# Patient Record
Sex: MALE | Race: WHITE | NOT HISPANIC OR LATINO | Employment: OTHER | URBAN - METROPOLITAN AREA
[De-identification: names, ages, dates, MRNs, and addresses within clinical notes are randomized per-mention and may not be internally consistent; named-entity substitution may affect disease eponyms.]

---

## 2021-09-16 ENCOUNTER — OFFICE VISIT (OUTPATIENT)
Dept: INTERNAL MEDICINE | Facility: CLINIC | Age: 46
End: 2021-09-16
Payer: COMMERCIAL

## 2021-09-16 VITALS
OXYGEN SATURATION: 96 % | DIASTOLIC BLOOD PRESSURE: 88 MMHG | SYSTOLIC BLOOD PRESSURE: 128 MMHG | WEIGHT: 217.81 LBS | HEIGHT: 73 IN | BODY MASS INDEX: 28.87 KG/M2 | TEMPERATURE: 98 F | HEART RATE: 81 BPM

## 2021-09-16 DIAGNOSIS — I10 ESSENTIAL HYPERTENSION: ICD-10-CM

## 2021-09-16 DIAGNOSIS — Z00.00 ROUTINE MEDICAL EXAM: Primary | ICD-10-CM

## 2021-09-16 PROCEDURE — 99999 PR PBB SHADOW E&M-NEW PATIENT-LVL III: ICD-10-PCS | Mod: PBBFAC,,, | Performed by: NURSE PRACTITIONER

## 2021-09-16 PROCEDURE — 99999 PR PBB SHADOW E&M-NEW PATIENT-LVL III: CPT | Mod: PBBFAC,,, | Performed by: NURSE PRACTITIONER

## 2021-09-16 PROCEDURE — 99204 OFFICE O/P NEW MOD 45 MIN: CPT | Mod: S$GLB,,, | Performed by: NURSE PRACTITIONER

## 2021-09-16 PROCEDURE — 99204 PR OFFICE/OUTPT VISIT, NEW, LEVL IV, 45-59 MIN: ICD-10-PCS | Mod: S$GLB,,, | Performed by: NURSE PRACTITIONER

## 2021-09-16 RX ORDER — MINERAL OIL
180 ENEMA (ML) RECTAL
COMMUNITY

## 2021-09-16 RX ORDER — LISINOPRIL 10 MG/1
10 TABLET ORAL DAILY
Qty: 90 TABLET | Refills: 1 | Status: SHIPPED | OUTPATIENT
Start: 2021-09-16 | End: 2022-03-24 | Stop reason: SDUPTHER

## 2021-09-16 RX ORDER — LISINOPRIL 10 MG/1
10 TABLET ORAL DAILY
COMMUNITY
Start: 2021-06-14 | End: 2021-09-16 | Stop reason: SDUPTHER

## 2022-01-19 ENCOUNTER — TELEPHONE (OUTPATIENT)
Dept: INTERNAL MEDICINE | Facility: CLINIC | Age: 47
End: 2022-01-19

## 2022-01-19 NOTE — TELEPHONE ENCOUNTER
----- Message from Bhargavi Guallpa sent at 1/19/2022  8:40 AM CST -----  Contact: REDD [phone]--  Patient would like to get medical advice.    Symptoms (please be specific):  --cough, fever 100.0, body aches,headaches and tested positive for COVID.    How long have you had these symptoms: --Sunday--    Would you like a call back, or a response through your [facility] portal?:--call back--    Pharmacy name and phone # (copy from chart):    [facility] #[id number] - [address] & [address] [address]  Phone: [phone] Fax: [phone]      Comments:  Pt mom calling to see what she needs to do because pt symptoms are getting worse. Please call to advise.

## 2022-01-19 NOTE — TELEPHONE ENCOUNTER
I called and the pt was just wondering if there was something more hw should be doing he tested positive Monday for COVID and I replied with the symptoms it has to really run it's course however , if it's more than usual and he notices he's getting worse definitely f/u he verbalized understanding. //kah

## 2022-03-24 ENCOUNTER — OFFICE VISIT (OUTPATIENT)
Dept: INTERNAL MEDICINE | Facility: CLINIC | Age: 47
End: 2022-03-24
Payer: COMMERCIAL

## 2022-03-24 VITALS
HEART RATE: 79 BPM | OXYGEN SATURATION: 97 % | BODY MASS INDEX: 28.36 KG/M2 | SYSTOLIC BLOOD PRESSURE: 138 MMHG | TEMPERATURE: 98 F | DIASTOLIC BLOOD PRESSURE: 92 MMHG | WEIGHT: 214.94 LBS

## 2022-03-24 DIAGNOSIS — M54.12 CERVICAL RADICULITIS: ICD-10-CM

## 2022-03-24 DIAGNOSIS — Z00.00 ROUTINE GENERAL MEDICAL EXAMINATION AT A HEALTH CARE FACILITY: Primary | ICD-10-CM

## 2022-03-24 DIAGNOSIS — I10 ESSENTIAL HYPERTENSION: ICD-10-CM

## 2022-03-24 DIAGNOSIS — Z98.890 HISTORY OF LAMINECTOMY: ICD-10-CM

## 2022-03-24 DIAGNOSIS — Z12.11 COLON CANCER SCREENING: ICD-10-CM

## 2022-03-24 DIAGNOSIS — I10 PRIMARY HYPERTENSION: ICD-10-CM

## 2022-03-24 DIAGNOSIS — Z91.09 ENVIRONMENTAL ALLERGIES: ICD-10-CM

## 2022-03-24 DIAGNOSIS — Z86.711 HISTORY OF PULMONARY EMBOLISM: ICD-10-CM

## 2022-03-24 PROCEDURE — 99214 PR OFFICE/OUTPT VISIT, EST, LEVL IV, 30-39 MIN: ICD-10-PCS | Mod: 25,S$GLB,, | Performed by: INTERNAL MEDICINE

## 2022-03-24 PROCEDURE — 99999 PR PBB SHADOW E&M-EST. PATIENT-LVL V: CPT | Mod: PBBFAC,,, | Performed by: INTERNAL MEDICINE

## 2022-03-24 PROCEDURE — 99396 PREV VISIT EST AGE 40-64: CPT | Mod: 25,S$GLB,, | Performed by: INTERNAL MEDICINE

## 2022-03-24 PROCEDURE — 99999 PR PBB SHADOW E&M-EST. PATIENT-LVL V: ICD-10-PCS | Mod: PBBFAC,,, | Performed by: INTERNAL MEDICINE

## 2022-03-24 PROCEDURE — 99214 OFFICE O/P EST MOD 30 MIN: CPT | Mod: 25,S$GLB,, | Performed by: INTERNAL MEDICINE

## 2022-03-24 PROCEDURE — 99396 PR PREVENTIVE VISIT,EST,40-64: ICD-10-PCS | Mod: 25,S$GLB,, | Performed by: INTERNAL MEDICINE

## 2022-03-24 RX ORDER — LISINOPRIL 10 MG/1
10 TABLET ORAL DAILY
Qty: 90 TABLET | Refills: 1 | Status: SHIPPED | OUTPATIENT
Start: 2022-03-24 | End: 2022-11-29

## 2022-03-24 RX ORDER — GABAPENTIN 300 MG/1
300 CAPSULE ORAL NIGHTLY
Qty: 30 CAPSULE | Refills: 0 | Status: SHIPPED | OUTPATIENT
Start: 2022-03-24 | End: 2022-04-29

## 2022-03-24 RX ORDER — PSEUDOEPHEDRINE HCL 120 MG/1
120 TABLET, FILM COATED, EXTENDED RELEASE ORAL
COMMUNITY
End: 2022-04-29

## 2022-03-24 RX ORDER — METHYLPREDNISOLONE 4 MG/1
TABLET ORAL
Qty: 1 EACH | Refills: 0 | Status: SHIPPED | OUTPATIENT
Start: 2022-03-24 | End: 2022-04-29 | Stop reason: ALTCHOICE

## 2022-03-24 NOTE — PROGRESS NOTES
Subjective:      Patient ID: Rafael Banegas is a 46 y.o. male.    Chief Complaint: Establish Care    HPI     47 yo with   Patient Active Problem List   Diagnosis    Primary hypertension    Environmental allergies    History of pulmonary embolism    History of laminectomy    Cervical radiculitis    Decreased range of motion with decreased strength     Past Medical History:   Diagnosis Date    Cervical radiculitis 3/29/2022    Hypertension      Here today for annual prev exam.  Compliant with meds without significant side effects. Energy and appetite are good.     Pt also c/o neck pain pain radiating into left arm. Worse with certain movements. Symptoms x 3 to 4 weeks. No weakness.       Past Surgical History:   Procedure Laterality Date    LUMBAR LAMINECTOMY  2017     Social History     Socioeconomic History    Marital status: Single   Tobacco Use    Smoking status: Never Smoker    Smokeless tobacco: Never Used   Substance and Sexual Activity    Alcohol use: Yes     Alcohol/week: 3.0 standard drinks     Types: 1 Glasses of wine, 2 Cans of beer per week    Drug use: Never    Sexual activity: Yes     Partners: Female     family history includes Arthritis in his maternal grandmother; Congenital heart disease in his maternal grandfather; Dementia in his father; Depression in his brother and mother; Heart block in his father; Heart disease in his father; Hypertension in his mother; Stroke in his father.    Review of Systems   Constitutional: Negative for chills and fever.   HENT: Negative for ear pain and sore throat.    Respiratory: Negative for cough.    Cardiovascular: Negative for chest pain.   Gastrointestinal: Negative for abdominal pain and blood in stool.   Genitourinary: Negative for dysuria and hematuria.   Skin: Negative for rash and wound.   Neurological: Negative for seizures and syncope.     Objective:   BP (!) 138/92   Pulse 79   Temp 97.7 °F (36.5 °C) (Tympanic)   Wt 97.5 kg (214 lb 15.2  oz)   SpO2 97%   BMI 28.36 kg/m²     Physical Exam  Constitutional:       General: He is not in acute distress.     Appearance: He is well-developed.   HENT:      Head: Normocephalic and atraumatic.   Eyes:      Pupils: Pupils are equal, round, and reactive to light.   Neck:      Thyroid: No thyromegaly.   Cardiovascular:      Rate and Rhythm: Normal rate and regular rhythm.   Pulmonary:      Breath sounds: Normal breath sounds. No wheezing or rales.   Abdominal:      General: Bowel sounds are normal.      Palpations: Abdomen is soft.      Tenderness: There is no abdominal tenderness.   Musculoskeletal:         General: No swelling. Normal range of motion.      Cervical back: Neck supple. No rigidity. No spinous process tenderness or muscular tenderness. Normal range of motion.   Lymphadenopathy:      Cervical: No cervical adenopathy.   Skin:     General: Skin is warm and dry.   Neurological:      Mental Status: He is alert and oriented to person, place, and time.      Motor: No weakness.      Gait: Gait normal.   Psychiatric:         Behavior: Behavior normal.         Assessment:     1. Routine general medical examination at a health care facility    2. Primary hypertension    3. Environmental allergies    4. History of pulmonary embolism    5. History of laminectomy    6. Colon cancer screening    7. Essential hypertension    8. Cervical radiculitis      Plan:   Routine general medical examination at a health care facility  Heart healthy diet and regular exercise.  Health maintenance reviewed patient  -     Comprehensive Metabolic Panel; Future; Expected date: 03/24/2022  -     CBC Auto Differential; Future; Expected date: 03/24/2022  -     TSH; Future; Expected date: 03/24/2022  -     Lipid Panel; Future; Expected date: 03/24/2022  -     Hepatitis C Antibody; Future; Expected date: 03/24/2022  -     Hemoglobin A1C; Future; Expected date: 03/24/2022  -     PSA, Screening; Future; Expected date:  03/24/2022    Primary hypertension    Environmental allergies    History of pulmonary embolism    History of laminectomy    Colon cancer screening  -     Cancel: Case Request Endoscopy: COLONOSCOPY    Essential hypertension  -     lisinopriL 10 MG tablet; Take 1 tablet (10 mg total) by mouth once daily.  Dispense: 90 tablet; Refill: 1    Cervical radiculitis  -     methylPREDNISolone (MEDROL, AFSHIN,) 4 mg tablet; use as directed  Dispense: 1 each; Refill: 0  -     gabapentin (NEURONTIN) 300 MG capsule; Take 1 capsule (300 mg total) by mouth every evening.  Dispense: 30 capsule; Refill: 0  -     Ambulatory referral/consult to Physical/Occupational Therapy; Future; Expected date: 03/31/2022  -     X-Ray Cervical Spine AP And Lateral; Future; Expected date: 03/24/2022        Lab Frequency Next Occurrence   Lipid Panel Once 09/16/2021   Comprehensive Metabolic Panel Once 09/16/2021   CBC Auto Differential Once 09/16/2021   TSH Once 09/16/2021       Problem List Items Addressed This Visit     Primary hypertension    Environmental allergies    History of pulmonary embolism    Overview     Surrounding laminectomy 2016. Reports neg genetic testing. Pulmonologist advised ok to stop anticoag.            History of laminectomy    Cervical radiculitis    Relevant Medications    methylPREDNISolone (MEDROL, AFSHIN,) 4 mg tablet    gabapentin (NEURONTIN) 300 MG capsule    Other Relevant Orders    Ambulatory referral/consult to Physical/Occupational Therapy    X-Ray Cervical Spine AP And Lateral (Completed)      Other Visit Diagnoses     Routine general medical examination at a health care facility    -  Primary    Relevant Orders    Comprehensive Metabolic Panel (Completed)    CBC Auto Differential (Completed)    TSH (Completed)    Lipid Panel (Completed)    Hepatitis C Antibody (Completed)    Hemoglobin A1C (Completed)    PSA, Screening (Completed)    Colon cancer screening        Essential hypertension        Relevant Medications     lisinopriL 10 MG tablet          Follow up in about 4 weeks (around 4/21/2022), or if symptoms worsen or fail to improve.  Wants fasting labs and xray  Monday at 8am.

## 2022-03-28 ENCOUNTER — HOSPITAL ENCOUNTER (OUTPATIENT)
Dept: RADIOLOGY | Facility: HOSPITAL | Age: 47
Discharge: HOME | End: 2022-03-28
Attending: INTERNAL MEDICINE
Payer: COMMERCIAL

## 2022-03-28 DIAGNOSIS — M54.12 CERVICAL RADICULITIS: ICD-10-CM

## 2022-03-28 PROCEDURE — 72040 X-RAY EXAM NECK SPINE 2-3 VW: CPT | Mod: TC

## 2022-03-28 PROCEDURE — 72040 X-RAY EXAM NECK SPINE 2-3 VW: CPT | Mod: 26,,, | Performed by: RADIOLOGY

## 2022-03-28 PROCEDURE — 72040 XR CERVICAL SPINE AP LATERAL: ICD-10-PCS | Mod: 26,,, | Performed by: RADIOLOGY

## 2022-03-29 ENCOUNTER — CLINICAL SUPPORT (OUTPATIENT)
Dept: REHABILITATION | Facility: HOSPITAL | Age: 47
End: 2022-03-29
Attending: INTERNAL MEDICINE
Payer: COMMERCIAL

## 2022-03-29 DIAGNOSIS — R53.1 DECREASED RANGE OF MOTION WITH DECREASED STRENGTH: ICD-10-CM

## 2022-03-29 DIAGNOSIS — M25.60 DECREASED RANGE OF MOTION WITH DECREASED STRENGTH: ICD-10-CM

## 2022-03-29 DIAGNOSIS — M54.12 CERVICAL RADICULITIS: ICD-10-CM

## 2022-03-29 PROCEDURE — 97161 PT EVAL LOW COMPLEX 20 MIN: CPT

## 2022-03-29 PROCEDURE — 97110 THERAPEUTIC EXERCISES: CPT

## 2022-03-29 NOTE — PLAN OF CARE
"[facility]  Physical Therapy Initial Evaluation    Name: Rafael Banegas  Clinic Number: [id number]    Therapy Diagnosis:  Encounter Diagnoses   Name Primary?    Cervical radiculitis     Decreased range of motion with decreased strength       Physician: Valdemar Mustafa MD   Physician Orders: PT Eval and Treat  Medical Diagnosis from Referral: Cervical radiculitis   Evaluation Date: 3/29/2022  Authorization Period Expiration: 12/31/22  Plan of Care Expiration: 05/29/2022    Progress Update: 04/29/2022  FOTO: 1 / 3    Visit # / Visits authorized: 1 / 1      PRECAUTIONS: Standard Precautions     MD Follow-up: 04/29/2022    Time In: 0945  Time Out: 1030  Total Appointment Time (timed & untimed codes): 30 minutes    SUBJECTIVE   Date of onset: 6 weeks     History of current condition - Rafael is a 46 y.o. male whom reports neck and left shoulder pain. The problem started about 6 weeks. He is experiencing numbness in the fingers as well. The patient states, "the pain increase on a skiing trip".   Rafael's currently exercises 3x a week. Has not exercise the last 2 weeks secondary to sinus infection.       Imaging: X-RAY performed on 03/28/2022  Mild degenerative changes    Prior Therapy: Yes  Social History: Pt lives with their family   Living Environment: House    ADLs unable to complete: dressing and working out   Gym/Home Equipment: yes   Occupation: Pt is Architecture     Prior Level of Function: Independent with all ADLs  Current Level of Function: 70% of PLOF  - Sitting Tolerance: 120 min  - Standing Tolerance: 15-60 min  - Walking Tolerance: 15-60 min  - Stair Tolerance: 3 flights    Pain:  Current 6 /10, worst 8 /10, best 4 /10   Location: Left shoulder (superior aspect)   Description: Aching, Throbbing and Tight  Aggravating Factors: Typing or overhead activities   Easing Factors: medication and rest     Pts goals: Pt reported goals are to be able to perform overhead activities without " pain.     _______________________________________________________  Medical History:   Past Medical History:   Diagnosis Date    Cervical radiculitis 3/29/2022    Hypertension        Surgical History:   Rafael Banegas  has a past surgical history that includes Lumbar laminectomy (2017).    Medications:   Rafael has a current medication list which includes the following prescription(s): fexofenadine, gabapentin, lisinopril, methylprednisolone, and pseudoephedrine.    Allergies:   Review of patient's allergies indicates:  No Known Allergies     OBJECTIVE   (x= Not Tested)   RANGE OF MOTION:    Cervical Spine/ Right  3/29/2022 Left    3/29/2022 Pain/Dysfunction with Movement Goal   Cervical Flexion (60) 75%  W/ pain  100%  Initial:    Cervical Extension (90) 50%  W/ pain  100%  Initial:    Cervical Side Bending (45) 50% 75% W/ pain  100%  Initial:    Cervical Rotation (75) 75% 75% W/ pain  Pain free  Initial:      NEURO SCREEN:    Neuro Testing Right  3/29/2022 Left  3/29/2022 Details   Reflexes  X X    Dermatomes Normal Normal    Myotome Deficits Normal Normal    Tone Normal Normal    Spasticity X X        FUNCTIONAL SCREEN:    Upper Extremity ROM Details STRENGTH Details   Shoulder WFL  discomfort Grossly 4/5  discomfort   Elbow WFL  discomfort Grossly 5/5  discomfort   Hand/Wrist WFL No pain  Grossly 5/5 No pain      Abdominal Strength STRENGTH Details   Pelvic Tilt x    Double Leg Drop x    Plank x        JOINT MOBILITY:     Joint Motion Tested Right  (spine)  3/29/2022 Left   3/29/2022 Goal   Cervical Mobility: C1-2 Hypomobile Hypomobile Normal B   Cervical Moiblity: C3-7 Hypomobile Hypomobile Normal B   Thoracic Mobility: T1-12 Hypomobile Hypomobile Normal B   Lumbar Mobility: L1-5 Normal Normal Normal B   Sacral Mobility Normal Normal Normal B     Sensation:  Sensation is intact to light touch    Palpation: Increased tone and tenderness noted with palpation to: lateral/anterior aspect of left scapula    Gait  Analysis: The patient ambulated with the following assistive device: none; the pt presents with the following gait abnormalities: forward head   FUNCTION:     CMS Impairment/Limitation/Restriction for FOTO Neck Survey    Therapist reviewed FOTO scores for Rafael Banegas on 3/29/2022.   FOTO documents entered into [employer] - see Media section.    Limitation Score: 37%         TREATMENT     Total Treatment time separate from Evaluation: (20) minutes    Rafael received therapeutic exercises to develop strength, endurance, ROM, flexibility, and posture for (10) minutes including: x = exercises performed     TherEx 3/29/2022    UBE next    [address] [place]      Standing chin tucks             Plan for Next Visit: Address thoracic mobility and scapula stability      Home Exercises and Patient Education Provided    Education/Self-Care provided: (included in treatment) minutes    Patient educated on the impairments noted above and the effects of physical therapy intervention to improve overall condition and QOL.    Patient was educated on all the above exercise prior/during/after for proper posture, positioning, and execution for safe performance with home exercise program.   Exercise Prescription:   o 3/29/2022: EVAL- Low     Written Home Exercises Provided: yes. Prefers: Electronic Copies  Exercises were reviewed and Rafael was able to demonstrate them prior to the end of the session.  Rafael demonstrated good understanding of the education provided.     See EMR under Patient Instructions for exercises provided during therapy sessions.    ASSESSMENT     Rafael is a 46 y.o. male referred to outpatient Physical Therapy with a medical diagnosis of muscle strain.  Rafael presents with clinical signs and symptoms that support this diagnosis with decreased Cervical ROM, decreased upper extremity strength, Cervical joint(s) hypomobility, upper extremity neural tension, and impaired functional mobility. Radicular symptoms are present from the  "cervical spine down into the elbow and decreased shoulder girdle ROM, decreased scapular and shoulder strength, Glenohumeral joint hypomobility, and impaired functional mobility.    The above impairments will be addressed through manual therapy techniques, therapeutic exercises, functional training, and modalities as necessary. Patient was treated and educated on exercises for home program, progression of therapy, and benefits of therapy to achieve full functional mobility.     Pt prognosis is Good.   Pt will benefit from skilled outpatient Physical Therapy to address the deficits stated above and in the chart below, provide pt/family education, and to maximize pt's level of independence.     Plan of care discussed with patient: Yes  Pt's spiritual, cultural and educational needs considered and patient is agreeable to the plan of care and goals as stated below:     Anticipated Barriers for therapy: occupation    Medical Necessity is demonstrated by the following  History  Co-morbidities and personal factors that may impact the plan of care Co-morbidities:   HTN    Personal Factors:   no deficits     low   Examination  Body Structures and Functions, activity limitations and participation restrictions that may impact the plan of care Body Regions:   neck  back    Body Systems:    gross symmetry  ROM  strength  transfers  motor control  motor learning    Participation Restrictions:   See above in "Current Level of Function"     Activity limitations:   Learning and applying knowledge  no deficits    General Tasks and Commands  no deficits    Communication  no deficits    Mobility  lifting and carrying objects    Self care  dressing  looking after one's health    Domestic Life  assisting others    Interactions/Relationships  no deficits    Life Areas  no deficits    Community and Social Life  community life  recreation and leisure         low   Clinical Presentation stable and uncomplicated low   Decision Making/ " Complexity Score: low       GOALS:  SHORT TERM GOALS: 4 weeks, (04/29/22) 3/29/2022   1. Recent signs and systems trend is improving in order to progress towards LTG's.    2. Patient will be independent with HEP in order to further progress and return to maximal function.    3. Pain rating at Worst: 5/10 in order to progress towards increased independence with activity.    4. Patient will be able to correct postural deviations in sitting and standing, to decrease pain and promote postural awareness for injury prevention.       LONG TERM GOALS: 8 weeks, (05/29/22) 3/29/2022   1. Patient will return to normal ADL, recreational, and work related activities with less pain and limitation.     2. Patient will improve AROM to stated goals in order to return to maximal functional potential.     3. Patient will improve Strength to stated goals of appropriate musculature in order to improve functional independence.     4. Pain Rating at Best: 1/10 to improve Quality of Life.     5. Patient will meet predicted functional outcome (FOTO) score: 24% to increase self-worth & perceived functional ability.    6. Patient will have met/partially met personal goal of: being able to lift 15lbs overhead 10x without pain.         PLAN   Plan of care Certification: 3/29/2022 to 05/29/2022    Outpatient Physical Therapy 2 times weekly for 8 weeks to include any combination of the following interventions: virtual visits, dry needling, modalities, electrical stimulation (IFC, Pre-Mod, Attended with Functional Dry Needling), Manual Therapy, Moist Heat/ Ice, Neuromuscular Re-ed, Patient Education, Self Care, Therapeutic Exercise, Functional Training and Therapeutic Activites     Thank you for this referral.    These services are reasonable and necessary for the conditions set forth above while under my care.    Harish Gomes, PT, DPT

## 2022-03-30 ENCOUNTER — OFFICE VISIT (OUTPATIENT)
Dept: INTERNAL MEDICINE | Facility: CLINIC | Age: 47
End: 2022-03-30
Payer: COMMERCIAL

## 2022-03-30 VITALS
DIASTOLIC BLOOD PRESSURE: 78 MMHG | TEMPERATURE: 98 F | OXYGEN SATURATION: 97 % | HEART RATE: 108 BPM | HEIGHT: 73 IN | BODY MASS INDEX: 27.1 KG/M2 | WEIGHT: 204.5 LBS | SYSTOLIC BLOOD PRESSURE: 120 MMHG

## 2022-03-30 DIAGNOSIS — J01.90 ACUTE BACTERIAL SINUSITIS: Primary | ICD-10-CM

## 2022-03-30 DIAGNOSIS — B96.89 ACUTE BACTERIAL SINUSITIS: Primary | ICD-10-CM

## 2022-03-30 DIAGNOSIS — J02.9 SORE THROAT: ICD-10-CM

## 2022-03-30 PROCEDURE — 3074F SYST BP LT 130 MM HG: CPT | Mod: CPTII,S$GLB,, | Performed by: PHYSICIAN ASSISTANT

## 2022-03-30 PROCEDURE — 99213 PR OFFICE/OUTPT VISIT, EST, LEVL III, 20-29 MIN: ICD-10-PCS | Mod: S$GLB,,, | Performed by: PHYSICIAN ASSISTANT

## 2022-03-30 PROCEDURE — 99213 OFFICE O/P EST LOW 20 MIN: CPT | Mod: S$GLB,,, | Performed by: PHYSICIAN ASSISTANT

## 2022-03-30 PROCEDURE — 87651 STREP A DNA AMP PROBE: CPT | Performed by: PHYSICIAN ASSISTANT

## 2022-03-30 PROCEDURE — 1159F MED LIST DOCD IN RCRD: CPT | Mod: CPTII,S$GLB,, | Performed by: PHYSICIAN ASSISTANT

## 2022-03-30 PROCEDURE — 1160F RVW MEDS BY RX/DR IN RCRD: CPT | Mod: CPTII,S$GLB,, | Performed by: PHYSICIAN ASSISTANT

## 2022-03-30 PROCEDURE — 3044F PR MOST RECENT HEMOGLOBIN A1C LEVEL <7.0%: ICD-10-PCS | Mod: CPTII,S$GLB,, | Performed by: PHYSICIAN ASSISTANT

## 2022-03-30 PROCEDURE — 3008F BODY MASS INDEX DOCD: CPT | Mod: CPTII,S$GLB,, | Performed by: PHYSICIAN ASSISTANT

## 2022-03-30 PROCEDURE — 4010F ACE/ARB THERAPY RXD/TAKEN: CPT | Mod: CPTII,S$GLB,, | Performed by: PHYSICIAN ASSISTANT

## 2022-03-30 PROCEDURE — 3078F DIAST BP <80 MM HG: CPT | Mod: CPTII,S$GLB,, | Performed by: PHYSICIAN ASSISTANT

## 2022-03-30 PROCEDURE — 99999 PR PBB SHADOW E&M-EST. PATIENT-LVL IV: CPT | Mod: PBBFAC,,, | Performed by: PHYSICIAN ASSISTANT

## 2022-03-30 PROCEDURE — 3044F HG A1C LEVEL LT 7.0%: CPT | Mod: CPTII,S$GLB,, | Performed by: PHYSICIAN ASSISTANT

## 2022-03-30 PROCEDURE — 99999 PR PBB SHADOW E&M-EST. PATIENT-LVL IV: ICD-10-PCS | Mod: PBBFAC,,, | Performed by: PHYSICIAN ASSISTANT

## 2022-03-30 PROCEDURE — 3078F PR MOST RECENT DIASTOLIC BLOOD PRESSURE < 80 MM HG: ICD-10-PCS | Mod: CPTII,S$GLB,, | Performed by: PHYSICIAN ASSISTANT

## 2022-03-30 PROCEDURE — 3074F PR MOST RECENT SYSTOLIC BLOOD PRESSURE < 130 MM HG: ICD-10-PCS | Mod: CPTII,S$GLB,, | Performed by: PHYSICIAN ASSISTANT

## 2022-03-30 PROCEDURE — 4010F PR ACE/ARB THEARPY RXD/TAKEN: ICD-10-PCS | Mod: CPTII,S$GLB,, | Performed by: PHYSICIAN ASSISTANT

## 2022-03-30 PROCEDURE — 3008F PR BODY MASS INDEX (BMI) DOCUMENTED: ICD-10-PCS | Mod: CPTII,S$GLB,, | Performed by: PHYSICIAN ASSISTANT

## 2022-03-30 PROCEDURE — 1160F PR REVIEW ALL MEDS BY PRESCRIBER/CLIN PHARMACIST DOCUMENTED: ICD-10-PCS | Mod: CPTII,S$GLB,, | Performed by: PHYSICIAN ASSISTANT

## 2022-03-30 PROCEDURE — 1159F PR MEDICATION LIST DOCUMENTED IN MEDICAL RECORD: ICD-10-PCS | Mod: CPTII,S$GLB,, | Performed by: PHYSICIAN ASSISTANT

## 2022-03-30 RX ORDER — AMOXICILLIN 500 MG/1
500 CAPSULE ORAL EVERY 12 HOURS
Qty: 20 CAPSULE | Refills: 0 | Status: SHIPPED | OUTPATIENT
Start: 2022-03-30 | End: 2022-04-09

## 2022-03-30 RX ORDER — AZELASTINE 1 MG/ML
1 SPRAY, METERED NASAL 2 TIMES DAILY
Qty: 30 ML | Refills: 0 | Status: SHIPPED | OUTPATIENT
Start: 2022-03-30 | End: 2022-12-23

## 2022-03-30 NOTE — PROGRESS NOTES
Subjective:       Patient ID: Rafael Banegas is a 46 y.o. male.    Chief Complaint: Nasal Congestion, Headache, and Chills      Patient Care Team:  Valdemar Mustafa MD as PCP - General (Internal Medicine)    HPI    Rafael Banegas is a 46 y.o. male who presents today with complaints of Nasal Congestion, Headache, and Chills  10 day history of sinus pressure, nasal congestion, postnasal congestion, cough productive, sore throat.   Symptoms are constant and gradually worsening.   Taking Sudafed and just completed Medrol.       Review of Systems   Constitutional: Negative for chills, fatigue, fever and unexpected weight change.   HENT: Positive for congestion, postnasal drip, sinus pressure, sneezing and sore throat. Negative for ear pain and trouble swallowing.    Respiratory: Positive for cough. Negative for shortness of breath.    Cardiovascular: Negative for chest pain.   Gastrointestinal: Negative for diarrhea, nausea and vomiting.   Neurological: Positive for headaches.       Objective:      Physical Exam  Vitals and nursing note reviewed.   Constitutional:       General: He is not in acute distress.     Appearance: He is well-developed.   HENT:      Head: Normocephalic and atraumatic.      Right Ear: Tympanic membrane, ear canal and external ear normal.      Left Ear: Tympanic membrane, ear canal and external ear normal.      Nose: No mucosal edema.      Comments: Erythematous nasal mucosa noted     Mouth/Throat:      Lips: Pink.      Mouth: Mucous membranes are moist.      Pharynx: Posterior oropharyngeal erythema present.      Tonsils: Tonsillar exudate present. 1+ on the right. 1+ on the left.      Comments: Clear PND noted to posterior pharynx    Eyes:      General: Lids are normal. No scleral icterus.     Extraocular Movements: Extraocular movements intact.      Conjunctiva/sclera: Conjunctivae normal.   Cardiovascular:      Rate and Rhythm: Regular rhythm. Tachycardia present.      Heart sounds: No murmur  heard.    No friction rub. No gallop.   Pulmonary:      Effort: Pulmonary effort is normal.      Breath sounds: Normal breath sounds. No decreased breath sounds, wheezing, rhonchi or rales.   Lymphadenopathy:      Cervical: No cervical adenopathy.   Neurological:      Mental Status: He is alert.      Cranial Nerves: No cranial nerve deficit.      Gait: Gait normal.   Psychiatric:         Mood and Affect: Mood and affect normal.         Assessment:       1. Acute bacterial sinusitis    2. Sore throat        Plan:     Problem List Items Addressed This Visit    None     Visit Diagnoses     Acute bacterial sinusitis    -  Primary    Relevant Medications    amoxicillin (AMOXIL) 500 MG capsule    azelastine (ASTELIN) 137 mcg (0.1 %) nasal spray    Sore throat        Relevant Orders    Group A Strep, Molecular (Completed)        Strep negative

## 2022-03-31 ENCOUNTER — TELEPHONE (OUTPATIENT)
Dept: INTERNAL MEDICINE | Facility: CLINIC | Age: 47
End: 2022-03-31

## 2022-03-31 LAB — GROUP A STREP, MOLECULAR: NEGATIVE

## 2022-03-31 NOTE — TELEPHONE ENCOUNTER
----- Message from Steph Sherman NP sent at 3/30/2022 12:15 PM CDT -----    ----- Message -----  From: [employer] [facility] [employer]  Sent: 3/28/2022   3:53 PM CDT  To: Steph Sherman NP

## 2022-03-31 NOTE — TELEPHONE ENCOUNTER
Liver enzymes are significantly elevated. We need appt to discuss this further. Virtual visit is ok.

## 2022-04-08 ENCOUNTER — TELEPHONE (OUTPATIENT)
Dept: INTERNAL MEDICINE | Facility: CLINIC | Age: 47
End: 2022-04-08

## 2022-04-08 NOTE — TELEPHONE ENCOUNTER
----- Message from Slick Moraes sent at 4/8/2022  9:38 AM CDT -----  Contact: PT  Returning call about some test results. Call back at [phone].

## 2022-04-08 NOTE — TELEPHONE ENCOUNTER
Informed pt that his liver enzymes were elevated and Dr. Mustafa recommended an appt to speak about it. He stated that he already has an appt at the end of the month and will be fine until then.

## 2022-04-14 ENCOUNTER — TELEPHONE (OUTPATIENT)
Dept: ADMINISTRATIVE | Facility: HOSPITAL | Age: 47
End: 2022-04-14

## 2022-04-14 DIAGNOSIS — Z12.11 COLON CANCER SCREENING: Primary | ICD-10-CM

## 2022-04-29 ENCOUNTER — OFFICE VISIT (OUTPATIENT)
Dept: INTERNAL MEDICINE | Facility: CLINIC | Age: 47
End: 2022-04-29
Payer: COMMERCIAL

## 2022-04-29 VITALS
WEIGHT: 212.06 LBS | SYSTOLIC BLOOD PRESSURE: 124 MMHG | HEART RATE: 97 BPM | OXYGEN SATURATION: 98 % | TEMPERATURE: 98 F | DIASTOLIC BLOOD PRESSURE: 76 MMHG | BODY MASS INDEX: 27.98 KG/M2

## 2022-04-29 DIAGNOSIS — I10 PRIMARY HYPERTENSION: Primary | ICD-10-CM

## 2022-04-29 DIAGNOSIS — Z13.6 ENCOUNTER FOR SCREENING FOR CARDIOVASCULAR DISORDERS: ICD-10-CM

## 2022-04-29 DIAGNOSIS — Z82.49 FAMILY HISTORY OF CORONARY ARTERY DISEASE: ICD-10-CM

## 2022-04-29 DIAGNOSIS — R74.8 ELEVATED LIVER ENZYMES: ICD-10-CM

## 2022-04-29 DIAGNOSIS — Z91.89 INTERMEDIATE RISK FOR CORONARY ARTERY DISEASE: ICD-10-CM

## 2022-04-29 PROCEDURE — 3008F PR BODY MASS INDEX (BMI) DOCUMENTED: ICD-10-PCS | Mod: CPTII,S$GLB,, | Performed by: INTERNAL MEDICINE

## 2022-04-29 PROCEDURE — 4010F ACE/ARB THERAPY RXD/TAKEN: CPT | Mod: CPTII,S$GLB,, | Performed by: INTERNAL MEDICINE

## 2022-04-29 PROCEDURE — 99999 PR PBB SHADOW E&M-EST. PATIENT-LVL IV: CPT | Mod: PBBFAC,,, | Performed by: INTERNAL MEDICINE

## 2022-04-29 PROCEDURE — 99999 PR PBB SHADOW E&M-EST. PATIENT-LVL IV: ICD-10-PCS | Mod: PBBFAC,,, | Performed by: INTERNAL MEDICINE

## 2022-04-29 PROCEDURE — 3044F PR MOST RECENT HEMOGLOBIN A1C LEVEL <7.0%: ICD-10-PCS | Mod: CPTII,S$GLB,, | Performed by: INTERNAL MEDICINE

## 2022-04-29 PROCEDURE — 3044F HG A1C LEVEL LT 7.0%: CPT | Mod: CPTII,S$GLB,, | Performed by: INTERNAL MEDICINE

## 2022-04-29 PROCEDURE — 4010F PR ACE/ARB THEARPY RXD/TAKEN: ICD-10-PCS | Mod: CPTII,S$GLB,, | Performed by: INTERNAL MEDICINE

## 2022-04-29 PROCEDURE — 3074F SYST BP LT 130 MM HG: CPT | Mod: CPTII,S$GLB,, | Performed by: INTERNAL MEDICINE

## 2022-04-29 PROCEDURE — 3008F BODY MASS INDEX DOCD: CPT | Mod: CPTII,S$GLB,, | Performed by: INTERNAL MEDICINE

## 2022-04-29 PROCEDURE — 3078F PR MOST RECENT DIASTOLIC BLOOD PRESSURE < 80 MM HG: ICD-10-PCS | Mod: CPTII,S$GLB,, | Performed by: INTERNAL MEDICINE

## 2022-04-29 PROCEDURE — 99214 OFFICE O/P EST MOD 30 MIN: CPT | Mod: S$GLB,,, | Performed by: INTERNAL MEDICINE

## 2022-04-29 PROCEDURE — 1159F PR MEDICATION LIST DOCUMENTED IN MEDICAL RECORD: ICD-10-PCS | Mod: CPTII,S$GLB,, | Performed by: INTERNAL MEDICINE

## 2022-04-29 PROCEDURE — 3078F DIAST BP <80 MM HG: CPT | Mod: CPTII,S$GLB,, | Performed by: INTERNAL MEDICINE

## 2022-04-29 PROCEDURE — 3074F PR MOST RECENT SYSTOLIC BLOOD PRESSURE < 130 MM HG: ICD-10-PCS | Mod: CPTII,S$GLB,, | Performed by: INTERNAL MEDICINE

## 2022-04-29 PROCEDURE — 1159F MED LIST DOCD IN RCRD: CPT | Mod: CPTII,S$GLB,, | Performed by: INTERNAL MEDICINE

## 2022-04-29 PROCEDURE — 99214 PR OFFICE/OUTPT VISIT, EST, LEVL IV, 30-39 MIN: ICD-10-PCS | Mod: S$GLB,,, | Performed by: INTERNAL MEDICINE

## 2022-04-29 NOTE — PROGRESS NOTES
Subjective:      Patient ID: Rafael Banegas is a 46 y.o. male.    Chief Complaint: Follow-up    HPI     45 yo with   Patient Active Problem List   Diagnosis    Primary hypertension    Environmental allergies    History of pulmonary embolism    History of laminectomy    Cervical radiculitis    Decreased range of motion with decreased strength     Past Medical History:   Diagnosis Date    Cervical radiculitis 3/29/2022    Hypertension      Here today for management of mult med problems as outlined below.  Compliant with meds without significant side effects. Energy and appetite good.  Shoulder and neck pain improved. Does not want pt due to cost.       Review of Systems   Constitutional: Negative for chills and fever.   HENT: Negative for ear pain and sore throat.    Respiratory: Negative for cough.    Cardiovascular: Negative for chest pain.   Gastrointestinal: Negative for abdominal pain and blood in stool.   Genitourinary: Negative for dysuria and hematuria.   Neurological: Negative for seizures and syncope.     Objective:   /76 (BP Location: Left arm, Patient Position: Sitting, BP Method: Large (Manual))   Pulse 97   Temp 97.9 °F (36.6 °C) (Tympanic)   Wt 96.2 kg (212 lb 1.3 oz)   SpO2 98%   BMI 27.98 kg/m²     Physical Exam  Constitutional:       General: He is not in acute distress.     Appearance: He is well-developed.   HENT:      Head: Normocephalic and atraumatic.   Eyes:      Pupils: Pupils are equal, round, and reactive to light.   Neck:      Thyroid: No thyromegaly.   Cardiovascular:      Rate and Rhythm: Normal rate and regular rhythm.   Pulmonary:      Breath sounds: Normal breath sounds. No wheezing or rales.   Abdominal:      General: Bowel sounds are normal.      Palpations: Abdomen is soft.      Tenderness: There is no abdominal tenderness.   Musculoskeletal:      Cervical back: Neck supple.   Lymphadenopathy:      Cervical: No cervical adenopathy.   Skin:     General: Skin is  warm and dry.   Neurological:      Mental Status: He is alert and oriented to person, place, and time.   Psychiatric:         Behavior: Behavior normal.         No visits with results within 2 Week(s) from this visit.   Latest known visit with results is:   Office Visit on 03/30/2022   Component Date Value Ref Range Status    Group A Strep, Molecular 03/30/2022 Negative  Negative Final    Comment: Arcanobacterium haemolyticum and Beta Streptococcus group C   and G will not be detected by this test method.  Please order   Throat Culture ([id number]) if suspected.       The 10-year ASCVD risk score (Saul KOTHARI Jr., et al., 2013) is: 6.5%    Values used to calculate the score:      Age: 46 years      Sex: Male      Is Non- : No      Diabetic: No      Tobacco smoker: No      Systolic Blood Pressure: 124 mmHg      Is BP treated: Yes      HDL Cholesterol: 33 mg/dL      Total Cholesterol: 264 mg/dL    Assessment:     1. Primary hypertension    2. Intermediate risk for coronary artery disease    3. Encounter for screening for cardiovascular disorders    4. Family history of coronary artery disease    5. Elevated liver enzymes      Plan:   Primary hypertension  controlled  Intermediate risk for coronary artery disease  New dx.   -     CT Cardiac Scoring; Future; Expected date: 04/29/2022    Encounter for screening for cardiovascular disorders  -     CT Cardiac Scoring; Future; Expected date: 04/29/2022    Family history of coronary artery disease  -     CT Cardiac Scoring; Future; Expected date: 04/29/2022    Elevated liver enzymes  Denies regular Tylenol and alcohol use.  Does report viral symptoms around the time of lab draw.  -     Hepatic Function Panel; Future; Expected date: 05/29/2022  -     Hepatitis B Surface Antigen; Future; Expected date: 04/29/2022  -     Hepatitis C Antibody; Future; Expected date: 04/29/2022        Lab Frequency Next Occurrence   Ambulatory referral/consult to Endo Procedure   Once 04/15/2022       Problem List Items Addressed This Visit     Primary hypertension - Primary      Other Visit Diagnoses     Intermediate risk for coronary artery disease        Relevant Orders    CT Cardiac Scoring    Encounter for screening for cardiovascular disorders        Relevant Orders    CT Cardiac Scoring    Family history of coronary artery disease        Relevant Orders    CT Cardiac Scoring    Elevated liver enzymes        Relevant Orders    Hepatic Function Panel    Hepatitis B Surface Antigen    Hepatitis C Antibody          Follow up in about 6 months (around 10/29/2022), or if symptoms worsen or fail to improve.

## 2022-04-30 ENCOUNTER — TELEPHONE (OUTPATIENT)
Dept: INTERNAL MEDICINE | Facility: CLINIC | Age: 47
End: 2022-04-30

## 2022-05-04 ENCOUNTER — LAB VISIT (OUTPATIENT)
Dept: LAB | Facility: HOSPITAL | Age: 47
End: 2022-05-04
Attending: INTERNAL MEDICINE
Payer: COMMERCIAL

## 2022-05-04 DIAGNOSIS — R74.8 ELEVATED LIVER ENZYMES: ICD-10-CM

## 2022-05-04 LAB
ALBUMIN SERPL BCP-MCNC: 4 G/DL (ref 3.5–5.2)
ALP SERPL-CCNC: 66 U/L (ref 55–135)
ALT SERPL W/O P-5'-P-CCNC: 26 U/L (ref 10–44)
AST SERPL-CCNC: 24 U/L (ref 10–40)
BILIRUB DIRECT SERPL-MCNC: 0.1 MG/DL (ref 0.1–0.3)
BILIRUB SERPL-MCNC: 0.4 MG/DL (ref 0.1–1)
PROT SERPL-MCNC: 7.4 G/DL (ref 6–8.4)

## 2022-05-04 PROCEDURE — 80076 HEPATIC FUNCTION PANEL: CPT | Performed by: INTERNAL MEDICINE

## 2022-05-04 PROCEDURE — 87340 HEPATITIS B SURFACE AG IA: CPT | Performed by: INTERNAL MEDICINE

## 2022-05-04 PROCEDURE — 86803 HEPATITIS C AB TEST: CPT | Performed by: INTERNAL MEDICINE

## 2022-05-04 PROCEDURE — 36415 COLL VENOUS BLD VENIPUNCTURE: CPT | Performed by: INTERNAL MEDICINE

## 2022-05-06 ENCOUNTER — HOSPITAL ENCOUNTER (OUTPATIENT)
Dept: PREADMISSION TESTING | Facility: HOSPITAL | Age: 47
Discharge: HOME | End: 2022-05-06
Attending: INTERNAL MEDICINE
Payer: COMMERCIAL

## 2022-05-06 DIAGNOSIS — Z12.11 COLON CANCER SCREENING: Primary | ICD-10-CM

## 2022-05-06 LAB
HBV SURFACE AG SERPL QL IA: NEGATIVE
HCV AB SERPL QL IA: NEGATIVE

## 2022-05-06 RX ORDER — SODIUM, POTASSIUM,MAG SULFATES 17.5-3.13G
1 SOLUTION, RECONSTITUTED, ORAL ORAL DAILY
Qty: 1 KIT | Refills: 0 | Status: SHIPPED | OUTPATIENT
Start: 2022-05-06 | End: 2022-05-08

## 2022-05-12 ENCOUNTER — HOSPITAL ENCOUNTER (OUTPATIENT)
Dept: RADIOLOGY | Facility: HOSPITAL | Age: 47
Discharge: HOME | End: 2022-05-12
Attending: INTERNAL MEDICINE
Payer: COMMERCIAL

## 2022-05-12 DIAGNOSIS — Z13.6 ENCOUNTER FOR SCREENING FOR CARDIOVASCULAR DISORDERS: ICD-10-CM

## 2022-05-12 DIAGNOSIS — Z82.49 FAMILY HISTORY OF CORONARY ARTERY DISEASE: ICD-10-CM

## 2022-05-12 DIAGNOSIS — Z91.89 INTERMEDIATE RISK FOR CORONARY ARTERY DISEASE: ICD-10-CM

## 2022-05-12 PROCEDURE — 75571 CT CALCIUM SCORING CARDIAC: ICD-10-PCS | Mod: 26,,, | Performed by: RADIOLOGY

## 2022-05-12 PROCEDURE — 75571 CT HRT W/O DYE W/CA TEST: CPT | Mod: TC

## 2022-05-12 PROCEDURE — 75571 CT HRT W/O DYE W/CA TEST: CPT | Mod: 26,,, | Performed by: RADIOLOGY

## 2022-06-17 ENCOUNTER — TELEPHONE (OUTPATIENT)
Dept: PREADMISSION TESTING | Facility: HOSPITAL | Age: 47
End: 2022-06-17
Payer: COMMERCIAL

## 2022-06-17 NOTE — PRE-PROCEDURE INSTRUCTIONS
PAT call completed.  Patient educated on procedure instructions.  Medical history discussed and patient informed of arrival time of 7:00 AM on Friday, June 24, 2022 at the [place], and was made aware of the limited-visitor policy, and that  is to remain during the entire visit.  All questions and concerns addressed.  Endoscopy instructions reviewed. Instructed no solid food, only clear liquids, the day before the procedure, then at 6 PM, the day before the procedure, drink the first half of the bowel prep, then at 2 AM, the morning of procedure, drink the second half of the prep, then do not eat or drink anything until after the procedure.  Also instructed to only take blood pressure medication Lisinopril the morning of procedure with just a few small sips of water.  Pre-procedure covid testing not needed, patient is covid vaccinated. Patient verbalized understanding of all instructions.

## 2022-06-22 ENCOUNTER — ANESTHESIA EVENT (OUTPATIENT)
Dept: ENDOSCOPY | Facility: HOSPITAL | Age: 47
End: 2022-06-22
Payer: COMMERCIAL

## 2022-06-22 NOTE — ANESTHESIA PREPROCEDURE EVALUATION
06/22/2022  Rafael Banegas is a 46 y.o., male.    Past Medical History:   Diagnosis Date    Cervical radiculitis 3/29/2022    Hypertension      Past Surgical History:   Procedure Laterality Date    LUMBAR LAMINECTOMY  2017       Pre-op Assessment    I have reviewed the Patient Summary Reports.     I have reviewed the Nursing Notes. I have reviewed the NPO Status.   I have reviewed the Medications.     Review of Systems  Anesthesia Hx:  No problems with previous Anesthesia  Denies Family Hx of Anesthesia complications.   Denies Personal Hx of Anesthesia complications.   Social:  No Alcohol Use, Alcohol Use    Hematology/Oncology:  Hematology Normal   Oncology Normal     EENT/Dental:EENT/Dental Normal   Cardiovascular:   Hypertension Denies MI.  Denies CAD.     Denies Angina. hyperlipidemia Remote H/o PE after back surgery. No anticoagulants.    Pulmonary:  Pulmonary Normal    Renal/:  Renal/ Normal     Hepatic/GI:   Bowel Prep.    Musculoskeletal:  Spine Disorders: cervical    Neurological:   Cervical radiculitis    Endocrine:  Endocrine Normal    Dermatological:  Skin Normal    Psych:  Psychiatric Normal           Physical Exam  General: Well nourished, Cooperative, Alert and Oriented    Airway:  Mallampati: II   Mouth Opening: Normal  TM Distance: Normal  Tongue: Normal  Neck ROM: Normal ROM    Dental:  Intact    Chest/Lungs:  Clear to auscultation, Normal Respiratory Rate    Heart:  Rate: Normal  Rhythm: Regular Rhythm        Anesthesia Plan  Type of Anesthesia, risks & benefits discussed:    Anesthesia Type: Gen Natural Airway  Intra-op Monitoring Plan: Standard ASA Monitors  Post Op Pain Control Plan: multimodal analgesia  Induction:  IV  Informed Consent: Informed consent signed with the Patient and all parties understand the risks and agree with anesthesia plan.  All questions answered. Patient  consented to blood products? No  ASA Score: 2  Day of Surgery Review of History & Physical: H&P Update referred to the surgeon/provider.    Ready For Surgery From Anesthesia Perspective.     .

## 2022-06-24 ENCOUNTER — ANESTHESIA (OUTPATIENT)
Dept: ENDOSCOPY | Facility: HOSPITAL | Age: 47
End: 2022-06-24
Payer: COMMERCIAL

## 2022-06-24 ENCOUNTER — HOSPITAL ENCOUNTER (OUTPATIENT)
Facility: HOSPITAL | Age: 47
Discharge: HOME | End: 2022-06-24
Attending: INTERNAL MEDICINE | Admitting: INTERNAL MEDICINE
Payer: COMMERCIAL

## 2022-06-24 VITALS
OXYGEN SATURATION: 100 % | HEART RATE: 68 BPM | SYSTOLIC BLOOD PRESSURE: 107 MMHG | WEIGHT: 196.44 LBS | BODY MASS INDEX: 26.03 KG/M2 | TEMPERATURE: 98 F | HEIGHT: 73 IN | RESPIRATION RATE: 17 BRPM | DIASTOLIC BLOOD PRESSURE: 70 MMHG

## 2022-06-24 DIAGNOSIS — Z12.12 ENCOUNTER FOR COLORECTAL CANCER SCREENING: Primary | ICD-10-CM

## 2022-06-24 DIAGNOSIS — Z12.11 ENCOUNTER FOR COLORECTAL CANCER SCREENING: Primary | ICD-10-CM

## 2022-06-24 PROCEDURE — 63600175 PHARM REV CODE 636 W HCPCS: Performed by: INTERNAL MEDICINE

## 2022-06-24 PROCEDURE — 37000009 HC ANESTHESIA EA ADD 15 MINS: Performed by: INTERNAL MEDICINE

## 2022-06-24 PROCEDURE — D9220A PRA ANESTHESIA: ICD-10-PCS | Mod: ANES,,, | Performed by: ANESTHESIOLOGY

## 2022-06-24 PROCEDURE — 63600175 PHARM REV CODE 636 W HCPCS: Performed by: NURSE ANESTHETIST, CERTIFIED REGISTERED

## 2022-06-24 PROCEDURE — D9220A PRA ANESTHESIA: Mod: ANES,,, | Performed by: ANESTHESIOLOGY

## 2022-06-24 PROCEDURE — D9220A PRA ANESTHESIA: ICD-10-PCS | Mod: CRNA,,, | Performed by: NURSE ANESTHETIST, CERTIFIED REGISTERED

## 2022-06-24 PROCEDURE — D9220A PRA ANESTHESIA: Mod: CRNA,,, | Performed by: NURSE ANESTHETIST, CERTIFIED REGISTERED

## 2022-06-24 PROCEDURE — G0121 COLON CA SCRN NOT HI RSK IND: HCPCS | Mod: ,,, | Performed by: INTERNAL MEDICINE

## 2022-06-24 PROCEDURE — 25000003 PHARM REV CODE 250: Performed by: NURSE ANESTHETIST, CERTIFIED REGISTERED

## 2022-06-24 PROCEDURE — G0121 COLON CA SCRN NOT HI RSK IND: ICD-10-PCS | Mod: ,,, | Performed by: INTERNAL MEDICINE

## 2022-06-24 PROCEDURE — 37000008 HC ANESTHESIA 1ST 15 MINUTES: Performed by: INTERNAL MEDICINE

## 2022-06-24 PROCEDURE — G0121 COLON CA SCRN NOT HI RSK IND: HCPCS | Performed by: INTERNAL MEDICINE

## 2022-06-24 RX ORDER — LIDOCAINE HYDROCHLORIDE 20 MG/ML
INJECTION, SOLUTION EPIDURAL; INFILTRATION; INTRACAUDAL; PERINEURAL
Status: DISCONTINUED | OUTPATIENT
Start: 2022-06-24 | End: 2022-06-24

## 2022-06-24 RX ORDER — PROPOFOL 10 MG/ML
VIAL (ML) INTRAVENOUS
Status: DISCONTINUED | OUTPATIENT
Start: 2022-06-24 | End: 2022-06-24

## 2022-06-24 RX ORDER — SODIUM CHLORIDE, SODIUM LACTATE, POTASSIUM CHLORIDE, CALCIUM CHLORIDE 600; 310; 30; 20 MG/100ML; MG/100ML; MG/100ML; MG/100ML
INJECTION, SOLUTION INTRAVENOUS CONTINUOUS
Status: DISCONTINUED | OUTPATIENT
Start: 2022-06-24 | End: 2022-06-24 | Stop reason: HOSPADM

## 2022-06-24 RX ADMIN — PROPOFOL 30 MG: 10 INJECTION, EMULSION INTRAVENOUS at 08:06

## 2022-06-24 RX ADMIN — PROPOFOL 50 MG: 10 INJECTION, EMULSION INTRAVENOUS at 08:06

## 2022-06-24 RX ADMIN — LIDOCAINE HYDROCHLORIDE 50 MG: 20 INJECTION, SOLUTION EPIDURAL; INFILTRATION; INTRACAUDAL; PERINEURAL at 08:06

## 2022-06-24 RX ADMIN — PROPOFOL 120 MG: 10 INJECTION, EMULSION INTRAVENOUS at 08:06

## 2022-06-24 RX ADMIN — SODIUM CHLORIDE, SODIUM LACTATE, POTASSIUM CHLORIDE, AND CALCIUM CHLORIDE: 600; 310; 30; 20 INJECTION, SOLUTION INTRAVENOUS at 08:06

## 2022-06-24 NOTE — [employer] PATIENT INSTRUCTIONS
Discharge Summary/Instructions after an Endoscopic Procedure  Patient Name: Rafael Banegas  Patient MRN: [id number]  Patient YOB: 1975  Friday, June 24, 2022  Erica Mensah MD  Dear patient,  As a result of recent federal legislation (The Federal Cures Act), you may   receive lab or pathology results from your procedure in your [facility]   account before your physician is able to contact you. Your physician or   their representative will relay the results to you with their   recommendations at their soonest availability.  Thank you,  RESTRICTIONS:  During your procedure today, you received medications for sedation.  These   medications may affect your judgment, balance and coordination.  Therefore,   for 24 hours, you have the following restrictions:   - DO NOT drive a car, operate machinery, make legal/financial decisions,   sign important papers or drink alcohol.    ACTIVITY:  Today: no heavy lifting, straining or running due to procedural   sedation/anesthesia.  The following day: return to full activity including work.  DIET:  Eat and drink normally unless instructed otherwise.     TREATMENT FOR COMMON SIDE EFFECTS:  - Mild abdominal pain, nausea, belching, bloating or excessive gas:  rest,   eat lightly and use a heating pad.  - Sore Throat: treat with throat lozenges and/or gargle with warm salt   water.  - Because air was used during the procedure, expelling large amounts of air   from your rectum or belching is normal.  - If a bowel prep was taken, you may not have a bowel movement for 1-3 days.    This is normal.  SYMPTOMS TO WATCH FOR AND REPORT TO YOUR PHYSICIAN:  1. Abdominal pain or bloating, other than gas cramps.  2. Chest pain.  3. Back pain.  4. Signs of infection such as: chills or fever occurring within 24 hours   after the procedure.  5. Rectal bleeding, which would show as bright red, maroon, or black stools.   (A tablespoon of blood from the rectum is not serious, especially if    hemorrhoids are present.)  6. Vomiting.  7. Weakness or dizziness.  GO DIRECTLY TO THE NEAREST EMERGENCY ROOM IF YOU HAVE ANY OF THE FOLLOWING:      Difficulty breathing              Chills and/or fever over 101 F   Persistent vomiting and/or vomiting blood   Severe abdominal pain   Severe chest pain   Black, tarry stools   Bleeding- more than one tablespoon   Any other symptom or condition that you feel may need urgent attention  Your doctor recommends these additional instructions:  If any biopsies were taken, your doctors clinic will contact you in 1 to 2   weeks with any results.  - Discharge patient to home (via wheelchair).   - Resume previous diet.   - Continue present medications.   - Repeat colonoscopy in 10 years for screening purposes.   - Patient has a contact number available for emergencies.  The signs and   symptoms of potential delayed complications were discussed with the   patient.  Return to normal activities tomorrow.  Written discharge   instructions were provided to the patient.  For questions, problems or results please call your physician Erica Mensah MD at Work:  [phone]  If you have any questions about the above instructions, call the GI   department at [phone] or call the endoscopy unit at [phone]   from 7am until 3 pm.  [facility] [place], EMERGENCY ROOM PHONE NUMBER:   [phone]  IF A COMPLICATION OR EMERGENCY SITUATION ARISES AND YOU ARE UNABLE TO REACH   YOUR PHYSICIAN - GO DIRECTLY TO THE EMERGENCY ROOM.  I have read or have had read to me these discharge instructions for my   procedure and have received a written copy.  I understand these   instructions and will follow-up with my physician if I have any questions.     __________________________________       _____________________________________  Nurse Signature                                          Patient/Designated   Responsible Party Signature  MD Erica Reina  MD Rodrick  6/24/2022 8:34:41 AM  [employer]

## 2022-06-24 NOTE — ANESTHESIA POSTPROCEDURE EVALUATION
Anesthesia Post Evaluation    Patient: Rafael Banegas    Procedure(s) Performed: Procedure(s) (LRB):  COLONOSCOPY (N/A)    Final Anesthesia Type: general      Patient location during evaluation: PACU  Patient participation: Yes- Able to Participate  Level of consciousness: awake and alert and oriented  Post-procedure vital signs: reviewed and stable  Pain management: adequate  Airway patency: patent    PONV status at discharge: No PONV  Anesthetic complications: no      Cardiovascular status: blood pressure returned to baseline, stable and hemodynamically stable  Respiratory status: unassisted  Hydration status: euvolemic  Follow-up not needed.          Vitals Value Taken Time   /70 06/24/22 0904   Temp 36.5 °C (97.7 °F) 06/24/22 0834   Pulse 68 06/24/22 0904   Resp 17 06/24/22 0904   SpO2 100 % 06/24/22 0904         Event Time   Out of Recovery 09:04:00         Pain/King Score: King Score: 10 (6/24/2022  9:04 AM)

## 2022-06-24 NOTE — DISCHARGE SUMMARY
[facility] - Endoscopy 1st Fl  Discharge Note  Short Stay    Procedure(s) (LRB):  COLONOSCOPY (N/A)    OUTCOME: Patient tolerated treatment/procedure well without complication and is now ready for discharge.    DISPOSITION: Home or Self Care    FINAL DIAGNOSIS:  Encounter for colorectal cancer screening    FOLLOWUP: With primary care provider    DISCHARGE INSTRUCTIONS:  No discharge procedures on file.

## 2022-06-24 NOTE — PLAN OF CARE
Discharge instructions reviewed with patient and visitor. Handouts given & verbalized understanding with no further questions at this time. Dr Mensah spoke to pt at bedside, reviewed procedure and findings, answered questions. MD telephone number provided per AVS sheet. VSS on RA, no pain or nausea noted, tolerating po fluids, no complaints noted. Fall precautions reviewed, consents in chart, PIV to be removed at discharge.

## 2022-06-24 NOTE — TRANSFER OF CARE
"Anesthesia Transfer of Care Note    Patient: Rafael Banegas    Procedure(s) Performed: Procedure(s) (LRB):  COLONOSCOPY (N/A)    Patient location: PACU    Anesthesia Type: general    Transport from OR: Transported from OR on room air with adequate spontaneous ventilation    Post pain: adequate analgesia    Post assessment: no apparent anesthetic complications    Post vital signs: stable    Level of consciousness: sedated    Nausea/Vomiting: no nausea/vomiting    Complications: none    Transfer of care protocol was followed      Last vitals:   Visit Vitals  /79 (BP Location: Left arm, Patient Position: Sitting)   Pulse 80   Temp 36.4 °C (97.6 °F) (Temporal)   Resp 16   Ht 6' 1" (1.854 m)   Wt 89.1 kg (196 lb 6.9 oz)   SpO2 99%   BMI 25.92 kg/m²     "

## 2022-06-24 NOTE — H&P
Short Stay Endoscopy History and Physical    PCP - Valdemar Mustafa MD    Procedure - Colonoscopy  ASA - 2  Mallampati - per anesthesia  History of Anesthesia problems - no  Family history Anesthesia problems -  no     HPI:  This is a 46 y.o. male here for evaluation of :   Active Hospital Problems    Diagnosis  POA    *Encounter for colorectal cancer screening [Z12.11, Z12.12]  Not Applicable      Resolved Hospital Problems   No resolved problems to display.         Health Maintenance       Date Due Completion Date    Colorectal Cancer Screening Never done ---    COVID-19 Vaccine (3 - Booster for Pfizer series) 09/29/2021 4/29/2021    HIV Screening 09/16/2027 (Originally 10/29/1990) ---    Influenza Vaccine (Season Ended) 09/01/2022 9/29/2019    TETANUS VACCINE 02/26/2026 2/26/2016    Lipid Panel 03/28/2027 3/28/2022          ROS:  CONSTITUTIONAL: Denies weight change,  fatigue, fevers, chills, night sweats.  CARDIOVASCULAR: Denies chest pain, shortness of breath, orthopnea and edema.  RESPIRATORY: Denies cough, hemoptysis, dyspnea, and wheezing.  GI: See HPI.    Medical History:   Past Medical History:   Diagnosis Date    Cervical radiculitis 3/29/2022    Hypertension        Surgical History:   Past Surgical History:   Procedure Laterality Date    LUMBAR LAMINECTOMY  2017       Family History:   Family History   Problem Relation Age of Onset    Depression Mother     Hypertension Mother     Stroke Father     Dementia Father     Heart block Father     Heart disease Father     Congenital heart disease Maternal Grandfather     Arthritis Maternal Grandmother     Depression Brother        Social History:   Social History     Tobacco Use    Smoking status: Never Smoker    Smokeless tobacco: Never Used   Substance Use Topics    Alcohol use: Yes     Alcohol/week: 3.0 standard drinks     Types: 1 Glasses of wine, 2 Cans of beer per week    Drug use: Never       Allergies:   Review of patient's allergies  indicates:  No Known Allergies    Medications:   No current facility-administered medications on file prior to encounter.     Current Outpatient Medications on File Prior to Encounter   Medication Sig Dispense Refill    azelastine (ASTELIN) 137 mcg (0.1 %) nasal spray 1 spray (137 mcg total) by Nasal route 2 (two) times daily. 30 mL 0    fexofenadine (ALLEGRA) 180 MG tablet Take 180 mg by mouth.      lisinopriL 10 MG tablet Take 1 tablet (10 mg total) by mouth once daily. 90 tablet 1       Physical Exam:  Vital Signs:   Vitals:    06/24/22 0718   BP: 117/79   Pulse: 80   Resp: 16   Temp: 97.6 °F (36.4 °C)     General Appearance: Well appearing in no acute distress  ENT: OP clear  Chest: CTA B  CV: RRR, no m/r/g  Abd: s/nt/nd/nabs  Ext: no edema    Labs:Reviewed    IMP:  Active Hospital Problems    Diagnosis  POA    *Encounter for colorectal cancer screening [Z12.11, Z12.12]  Not Applicable      Resolved Hospital Problems   No resolved problems to display.         Plan:   I have explained the risks and benefits of colonoscopy to the patient including but not limited to bleeding, perforation, infection, and death. The patient wishes to proceed.

## 2022-08-12 ENCOUNTER — OFFICE VISIT (OUTPATIENT)
Dept: DERMATOLOGY | Facility: CLINIC | Age: 47
End: 2022-08-12
Payer: COMMERCIAL

## 2022-08-12 DIAGNOSIS — L73.8 SEBACEOUS HYPERPLASIA: ICD-10-CM

## 2022-08-12 DIAGNOSIS — L57.0 ACTINIC KERATOSIS: ICD-10-CM

## 2022-08-12 DIAGNOSIS — D18.00 HEMANGIOMA, UNSPECIFIED SITE: ICD-10-CM

## 2022-08-12 DIAGNOSIS — L82.1 SEBORRHEIC KERATOSIS: ICD-10-CM

## 2022-08-12 DIAGNOSIS — L81.4 SOLAR LENTIGO: ICD-10-CM

## 2022-08-12 DIAGNOSIS — Z12.83 SKIN CANCER SCREENING: Primary | ICD-10-CM

## 2022-08-12 PROCEDURE — 4010F PR ACE/ARB THEARPY RXD/TAKEN: ICD-10-PCS | Mod: CPTII,S$GLB,, | Performed by: STUDENT IN AN ORGANIZED HEALTH CARE EDUCATION/TRAINING PROGRAM

## 2022-08-12 PROCEDURE — 99999 PR PBB SHADOW E&M-EST. PATIENT-LVL III: ICD-10-PCS | Mod: PBBFAC,,, | Performed by: STUDENT IN AN ORGANIZED HEALTH CARE EDUCATION/TRAINING PROGRAM

## 2022-08-12 PROCEDURE — 17000 DESTRUCT PREMALG LESION: CPT | Mod: S$GLB,,, | Performed by: STUDENT IN AN ORGANIZED HEALTH CARE EDUCATION/TRAINING PROGRAM

## 2022-08-12 PROCEDURE — 3044F HG A1C LEVEL LT 7.0%: CPT | Mod: CPTII,S$GLB,, | Performed by: STUDENT IN AN ORGANIZED HEALTH CARE EDUCATION/TRAINING PROGRAM

## 2022-08-12 PROCEDURE — 99999 PR PBB SHADOW E&M-EST. PATIENT-LVL III: CPT | Mod: PBBFAC,,, | Performed by: STUDENT IN AN ORGANIZED HEALTH CARE EDUCATION/TRAINING PROGRAM

## 2022-08-12 PROCEDURE — 1159F PR MEDICATION LIST DOCUMENTED IN MEDICAL RECORD: ICD-10-PCS | Mod: CPTII,S$GLB,, | Performed by: STUDENT IN AN ORGANIZED HEALTH CARE EDUCATION/TRAINING PROGRAM

## 2022-08-12 PROCEDURE — 1160F PR REVIEW ALL MEDS BY PRESCRIBER/CLIN PHARMACIST DOCUMENTED: ICD-10-PCS | Mod: CPTII,S$GLB,, | Performed by: STUDENT IN AN ORGANIZED HEALTH CARE EDUCATION/TRAINING PROGRAM

## 2022-08-12 PROCEDURE — 1159F MED LIST DOCD IN RCRD: CPT | Mod: CPTII,S$GLB,, | Performed by: STUDENT IN AN ORGANIZED HEALTH CARE EDUCATION/TRAINING PROGRAM

## 2022-08-12 PROCEDURE — 17000 PR DESTRUCTION(LASER SURGERY,CRYOSURGERY,CHEMOSURGERY),PREMALIGNANT LESIONS,FIRST LESION: ICD-10-PCS | Mod: S$GLB,,, | Performed by: STUDENT IN AN ORGANIZED HEALTH CARE EDUCATION/TRAINING PROGRAM

## 2022-08-12 PROCEDURE — 99204 PR OFFICE/OUTPT VISIT, NEW, LEVL IV, 45-59 MIN: ICD-10-PCS | Mod: 25,S$GLB,, | Performed by: STUDENT IN AN ORGANIZED HEALTH CARE EDUCATION/TRAINING PROGRAM

## 2022-08-12 PROCEDURE — 3044F PR MOST RECENT HEMOGLOBIN A1C LEVEL <7.0%: ICD-10-PCS | Mod: CPTII,S$GLB,, | Performed by: STUDENT IN AN ORGANIZED HEALTH CARE EDUCATION/TRAINING PROGRAM

## 2022-08-12 PROCEDURE — 4010F ACE/ARB THERAPY RXD/TAKEN: CPT | Mod: CPTII,S$GLB,, | Performed by: STUDENT IN AN ORGANIZED HEALTH CARE EDUCATION/TRAINING PROGRAM

## 2022-08-12 PROCEDURE — 1160F RVW MEDS BY RX/DR IN RCRD: CPT | Mod: CPTII,S$GLB,, | Performed by: STUDENT IN AN ORGANIZED HEALTH CARE EDUCATION/TRAINING PROGRAM

## 2022-08-12 PROCEDURE — 99204 OFFICE O/P NEW MOD 45 MIN: CPT | Mod: 25,S$GLB,, | Performed by: STUDENT IN AN ORGANIZED HEALTH CARE EDUCATION/TRAINING PROGRAM

## 2022-08-12 RX ORDER — TAZAROTENE 1 MG/G
CREAM TOPICAL
Qty: 30 G | Refills: 5 | Status: SHIPPED | OUTPATIENT
Start: 2022-08-12

## 2022-08-12 NOTE — PROGRESS NOTES
Patient Information  Name: Rafael Banegas  : 1975  MRN: [id number]     Referring Physician:  Dr. Mustafa   Primary Care Physician:  Dr. Valdemar Mustafa MD   Date of Visit: 2022      Subjective:       Rafael Banegas is a 46 y.o. male who presents for   Chief Complaint   Patient presents with    Spot     On the face and shoulders. His mother has a h/o skin cancer.      HPI  Patient with new complaint of lesion(s)  Location: face, shoulders  Duration: months  Symptoms: none  Relieving factors/Previous treatments: none    Patient was last seen:Visit date not found     Prior notes by myself reviewed.   Clinical documentation obtained by nursing staff reviewed.    Review of Systems   Skin: Negative for itching and rash.        Objective:    Physical Exam   Constitutional: He appears well-developed and well-nourished. No distress.   Neurological: He is alert and oriented to person, place, and time. He is not disoriented.   Psychiatric: He has a normal mood and affect.   Skin:   Areas Examined (abnormalities noted in diagram):   Head / Face Inspection Performed  Neck Inspection Performed  Chest / Axilla Inspection Performed  Back Inspection Performed  RUE Inspected  LUE Inspection Performed                   Diagram Legend     Erythematous scaling macule/papule c/w actinic keratosis       Vascular papule c/w angioma      Pigmented verrucoid papule/plaque c/w seborrheic keratosis      Yellow umbilicated papule c/w sebaceous hyperplasia      Irregularly shaped tan macule c/w lentigo     1-2 mm smooth white papules consistent with Milia      Movable subcutaneous cyst with punctum c/w epidermal inclusion cyst      Subcutaneous movable cyst c/w pilar cyst      Firm pink to brown papule c/w dermatofibroma      Pedunculated fleshy papule(s) c/w skin tag(s)      Evenly pigmented macule c/w junctional nevus     Mildly variegated pigmented, slightly irregular-bordered macule c/w mildly atypical nevus      Flesh colored  to evenly pigmented papule c/w intradermal nevus       Pink pearly papule/plaque c/w basal cell carcinoma      Erythematous hyperkeratotic cursted plaque c/w SCC      Surgical scar with no sign of skin cancer recurrence      Open and closed comedones      Inflammatory papules and pustules      Verrucoid papule consistent consistent with wart     Erythematous eczematous patches and plaques     Dystrophic onycholytic nail with subungual debris c/w onychomycosis     Umbilicated papule    Erythematous-base heme-crusted tan verrucoid plaque consistent with inflamed seborrheic keratosis     Erythematous Silvery Scaling Plaque c/w Psoriasis     See annotation      No images are attached to the encounter or orders placed in the encounter.    [] Data reviewed  [] Independent review of test  [] Management discussed with another provider    Assessment / Plan:        Skin cancer screening  Upper body skin examination performed today including at least 6 points as noted in physical examination. No lesions suspicious for malignancy noted.    Recommend daily sun protection/avoidance and use of at least SPF 30, broad spectrum sunscreen (OTC drug).     Actinic keratosis  Cryosurgery Procedure Note    Verbal consent from the patient is obtained including, but not limited to, risk of hypopigmentation/hyperpigmentation, scar, recurrence of lesion. The patient is aware of the precancerous quality and need for treatment of these lesions. Liquid nitrogen cryosurgery is applied to the 1 actinic keratoses, as detailed in the physical exam, to produce a freeze injury. The patient is aware that blisters may form and is instructed on wound care with gentle cleansing and use of vaseline ointment to keep moist until healed. The patient is supplied a handout on cryosurgery and is instructed to call if lesions do not completely resolve.    Solar lentigo  This is a benign hyperpigmented sun induced lesion. Recommend daily sun protection/avoidance  and use of at least SPF 30, broad spectrum sunscreen (OTC drug) will reduce the number of new lesions. Treatment of these benign lesions are considered cosmetic.    Hemangioma, unspecified site  This is a benign vascular lesion. Reassurance given. No treatment required.     Sebaceous hyperplasia  -     tazarotene (AVAGE) 0.1 % cream; Apply a pea size amount to entire face three nights per week.  Dispense: 30 g; Refill: 5    Seborrheic keratosis  These are benign inherited growths without a malignant potential. Reassurance given to patient. No treatment is necessary.              LOS NUMBER AND COMPLEXITY OF PROBLEMS    COMPLEXITY OF DATA RISK TOTAL TIME (m)   47098  92194 [] 1 self-limited or minor problem [x] Minimal to none [] No treatment recommended or patient to monitor 15-29  10-19   37582  27223 Low  [] 2 or > self limited or minor problems  [] 1 stable chronic illness  [] 1 acute, uncomplicated illness or injury Limited (2)  [] Prior external notes from each unique source  [] Review result of each unique test  [] Order each unique test []  Low  OTC medications, minor skin biopsy 30-44  20-29   22673  80800 Moderate  []  1 or > chronic illness with progression, exacerbation or SE of treatment  [x]  2 or more stable chronic illnesses  []  1 acute illness with systemic symptoms  []  1 acute complicated injury  []  1 undiagnosed new problem with uncertain prognosis Moderate (1/3 below)  []  3 or more data items        *Now includes assessment requiring independent historian  []  Independent interpretation of a test  []  Discuss management/test with another provider Moderate  [x]  Prescription drug mgmt  []  Minor surgery with risk discussed  []  Mgmt limited by social determinates 45-59  30-39   49632  36519 High  []  1 or more chronic illness with severe exacerbation, progression or SE of treatment  []  1 acute or chronic illness/injury that poses a threat to life or bodily function Extensive (2/3 below)  []  3  or more data items        *Now includes assessment requiring independent historian.  []  Independent interpretation of a test  []  Discuss management/test with another provider High  []  Major surgery with risk discussed  []  Drug therapy requiring intensive monitoring for toxicity  []  Hospitalization  []  Decision for DNR 60-74  40-54      No follow-ups on file.    Sandra Arreaga MD, FAAD  [facility]

## 2022-08-12 NOTE — PATIENT INSTRUCTIONS
CRYOSURGERY      Your doctor has used a method called cryosurgery to treat your skin condition. Cryosurgery refers to the use of very cold substances to treat a variety of skin conditions such as warts, pre-skin cancers, molluscum contagiosum, sun spots, and several benign growths. The substance we use in cryosurgery is liquid nitrogen and is so cold (-195 degrees Celsius) that is burns when administered.     Following treatment in the office, the skin may immediately burn and become red. You may find the area around the lesion is affected as well. It is sometimes necessary to treat not only the lesion, but a small area of the surrounding normal skin to achieve a good response.     A blister, and even a blood filled blister, may form after treatment.   This is a normal response. If the blister is painful, it is acceptable to sterilize a needle and with rubbing alcohol and gently pop the blister. It is important that you gently wash the area with soap and warm water as the blister fluid may contain wart virus if a wart was treated. Do no remove the roof of the blister.     The area treated can take anywhere from 1-3 weeks to heal. Healing time depends on the kind skin lesion treated, the location, and how aggressively the lesion was treated. It is recommended that the areas treated are covered with Vaseline or bacitracin ointment and a band-aid. If a band-aid is not practical, just ointment applied several times per day will do. Keeping these areas moist will speed the healing time.    Treatment with liquid nitrogen can leave a scar. In dark skin, it may be a light or dark scar, in light skin it may be a white or pink scar. These will generally fade with time, but may never go away completely.     If you have any concerns after your treatment, please feel free to call the office.       [address] [place] [address]/ [phone] FAX/ [email or web address]    SURGICAL ICU ADMISSION NOTE  11/10/2019    PRIMARY TEAM: Transplant   PRIMARY PHYSICIAN: Dr. Jeter     REASON FOR CRITICAL CARE ADMISSION: Hypercarbic respiratory failure, AMS   ADMITTING PHYSICIAN: Dr. Rudd     ASSESSMENT: 65 yo F with history of Marfan s Disease, aortic dissection (1977 & 1983), diastolic heart failure, heart transplant (2012) due to non-ischemic cardiomyopathy, HTN, A-fib on warfarin, HLD, restrictive lung disease, CVA (2010), depression, PE/DVT (2013), and MGUS. Notably, she had a prolonged hospitalization in early 2019 with hypoxic respiratory failure secondary to influenza A and JUWAN. She is dialysis dependent. Was admitted to the transplant service 11/8/19 after PD catheter placement with Dr. Jeter. She had been doing well on the floor until today 11/10/19 when she became lethargic, only responsive to deep sternal rub, with mental status changes. Has received narcan throughout the day with minimal improvement. Found to have respiratory acidosis with ABG 7.15/72/63/25 on 4L NC.     PLAN:   Neuro/ pain/ sedation:  #Altered mental status   #History of embolic CVA with right-sided deficit in 2013, reportedly now resolved   #History of depression   #Acute post operative pain   -Monitor neurological status. Notify the MD for any acute changes in exam.  -Follow up head CT final read.  -Tylenol for pain.  -Avoid narcotics   -PTA sertraline      Pulmonary care:   #History of restrictive lung disease secondary to chest wall restriction, O2 dependent at home   -4L O2 day and 3 L O2 night, per patient does not wear BIPAP or CPAP at home   #History of DVT/PE 2013   #History of difficult intubation   -BIPAP on arrival, ABG 7.15/72/63/25. Will repeat ABG.   -Supplemental oxygen to keep saturation above 92 %.  -Low threshold for intubation given altered mental status.   -Of note, patient has history of difficult intubation.       Cardiovascular:    #Hypotension   #Nonischemic cardiomyopathy s/p heart  transplant 2012   #Hx HTN   #Aortic dissection 1977, 1983 status post ascending aortic graft and MVR   -Monitor hemodynamic status.   -PTA carvedilol 25 BID - hold   -PTA losartan 50 BID - hold   -PTA lasix 40 mg daily - hold   -PTA statin, continue   -PTA tacrolimus 4 mg BID for immunosuppression, continue. Tacrolimus level to be drawn in am.   -Echo 5/16/19: EF 60-65%. Moderate concentric wall thickening consistent with left ventricular hypertrophy is present. Normal right ventricle. Enlarged left atrium due to cardiac transplantation. No valvular abnormalities.   -Will obtain troponin, EKG on admission. Trend trop if elevated.     GI care:   -NPO   -Bowel regimen      Fluids/ Electrolytes/ Nutrition:   #Hyperkalemia, mild 5.4   -NS at 50 for IV fluid hydration  -ICU electrolyte replacement protocol  -No indication for parenteral nutrition.  -Nutrition consulted. Appreciate recs.  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 Makes small amount of urine.   -Urine output is minimal so far.  -Strict I/Os  -HD since March 2019, 3x weekly  -Status post PD catheter placement 11/8   -Bladder scan on admission      Endocrine:    -No history of DM   -Hypoglycemia protocol   -Glucose checks q4 hours.      ID/ Antibiotics:  -Afebrile without leukocytosis   -No indication for antibiotics.      Heme:     #thrombocytopenia, 86.   #anemia, chronic hgb 9.5   -Hemoglobin stable.  -WBC 4.9.     Prophylaxis:    -Mechanical prophylaxis for DVT.   -Will discuss chemical ppx with the primary team in the am.   -No indication for stress ulcer ppx.      MSK:    #Hx osteoarthritis   #Hx Marfans  -PT and OT consulted. Appreciate recs.     Lines/ tubes/ drains:  -PIV x1, will place second   -Double lumen HD catheter   -PD catheter      Disposition:  -Surgical ICU.     Patient  seen, findings and plan discussed with surgical ICU staff, Dr. Rudd.    Gabriella Downing   Surgery Resident   [id number]    - - - - - - - - - - - - - - - - - - - - - - - - - - - - - - - - - - - - - - - - - - - - - - - - - - - - - - - - - - - - - - - - - - - - - - - -     HISTORY PRESENTING ILLNESS:   63 yo F with history of Marfan s Disease, aortic dissection (1977 & 1983), diastolic heart failure, heart transplant (2012) due to non-ischemic cardiomyopathy, HTN, A-fib on warfarin, HLD, restrictive lung disease, CVA (2010), depression, PE/DVT (2013), and MGUS. Notably, she had a prolonged hospitalization in early 2019 with hypoxic respiratory failure secondary to influenza A and JUWAN. She is dialysis dependent. Was admitted to the transplant service 11/8/19 after PD catheter placement with Dr. Jeter. She had been doing well on the floor until today 11/10/19 when she became lethargic, only responsive to deep sternal rub, with mental status changes. Has received narcan throughout the day with minimal improvement. Found to have respiratory acidosis with ABG 7.15/72/63/25 on 4L NC.     REVIEW OF SYSTEMS: 10 point ROS neg other than the symptoms noted above in the HPI.    PAST MEDICAL HISTORY:    has a past medical history of Acute rejection of heart transplant (H) (2/11/14), Aortic aneurysm and dissection (H) (1977), Aortic dissection, abdominal (H) (1983), Arthritis, Aspergillus pneumonia (H) (12/2012), CKD (chronic kidney disease), CVA (cerebral vascular accident) (H) (2010), Depression, Depressive disorder, Difficult intubation, DVT (deep venous thrombosis) (H) (1/2013), Frontal sinusitis, Heart rate problem, Heart transplant, orthotopic, status (H) (10/2/2012), Hemoptysis (10&11/2013), History of blood transfusion, History of recurrent UTIs (1/27/2012), HSV-1 (herpes simplex virus 1) infection (11/17/2014), Human metapneumovirus (hMPV) pneumonia (1/30/2018), biopsy, Hypertension, Marfan's syndrome, Nonischemic  cardiomyopathy (H), Norovirus (1/30/2018), Osteoporosis, Oxygen dependent, Peripheral neuropathy, Peripheral vascular disease (H), Pulmonary embolus (H) (1/2013), Restrictive lung disease, Steroid-induced diabetes mellitus (H), and Thrombosis of leg.    SURGICAL HISTORY:    has a past surgical history that includes primary hyperparathyroidism status post resection; S/P mitral + aoric Armen[place]shiley [place] [facility] [place]); Discending AAA - Repaired at [facility] (1983); colon - ischemic resected (2000); Tonsillectomy and Adenoidectomy; Transplant heart recipient (10/2/2012); appendectomy; colonoscopy; biopsy; picc insertion (Right, 5/19/2014); Optical tracking system endoscopic endonasal surgery (6/27/2014); Optical tracking system endoscopic endonasal surgery (Right, 8/19/2014); Repair Aortic Arch Interrupted (N/A, 11/4/2014); Endovascular repair aneurysm thoracic aortic (N/A, 11/4/2014); Cardiac surgery; Thoracic surgery; Bronchoscopy (rigid or flexible), diagnostic (N/A, 1/29/2018); Esophagoscopy, gastroscopy, duodenoscopy (EGD), combined (N/A, 11/20/2018); Colonoscopy (N/A, 11/20/2018); IR Thoracentesis (1/4/2019); Right Heart Cath with Possible Biopsy (N/A, 1/3/2019); IR Chest Tube Place Non Tunneled Right (1/31/2019); IR Chest Tube Place Non Tunneled Left (1/31/2019); IR Chest Tube Place Non Tunneled Right (2/12/2019); IR Visceral Angiogram (2/12/2019); IR Chest Tube Place Non Tunneled Right (2/22/2019); and IR CVC Tunnel Placement > 5 Yrs of Age (3/19/2019).    SOCIAL HISTORY:    reports that she has never smoked. She has never used smokeless tobacco. She reports that she does not drink alcohol or use drugs.    FAMILY HISTORY: No bleeding/clotting disorders nor problems with anesthesia.     ALLERGIES:      Allergies   Allergen Reactions     Blood Transfusion Related (Informational Only) Other (See Comments)     Patient has a history of a clinically significant antibody against RBC antigens.  A delay in compatible RBCs may  occur.       MEDICATIONS:  No current facility-administered medications on file prior to encounter.   furosemide (LASIX) 40 MG tablet, Take 1 tablet (40 mg) by mouth daily (Patient taking differently: Take 40 mg by mouth every morning )  multivitamin RENAL (NEPHROCAPS/TRIPHROCAPS) 1 MG capsule, Take 1 capsule by mouth daily (Patient taking differently: Take 1 capsule by mouth every morning )  sertraline (ZOLOFT) 50 MG tablet, Take 50 mg by mouth every morning         PHYSICAL EXAMINATION:  Temp:  [95.5  F (35.3  C)-98.9  F (37.2  C)] 97.9  F (36.6  C)  Pulse:  [89] 89  Heart Rate:  [83-91] 83  Resp:  [16-22] 16  BP: ()/(49-62) 89/50  SpO2:  [90 %-100 %] 98 %    GEN: Opens eyes to voice. Oriented to person and place. Disoriented to year. Motor and sensory exam intact in all four extremities. Pupils equal and reactive to light. Evidence of recent cataract surgery.    CV: RRR, non-cyanotic. Pectus carinatum. Evidence of previous heart transplant.    RESP: Decreased breath sounds bilaterally, saturating well on BIPAP.   ABD: Soft, minimally distended, diffusely tender to palpation. PD catheter dressed. No diffuse guarding or rebound tenderness.       LABS: Reviewed.   Arterial Blood Gases   Recent Labs   Lab 11/10/19  2307   PH 7.15*   PCO2 72*   PO2 63*   HCO3 25     Complete Blood Count   Recent Labs   Lab 11/08/19  1219   WBC 2.8*   HGB 9.6*   PLT 88*     Basic Metabolic Panel  Recent Labs   Lab 11/10/19  2307 11/09/19  2151 11/08/19  1219    135  --    POTASSIUM 5.4* 4.5 4.3   CHLORIDE 101 102  --    CO2 PENDING 27  --    BUN PENDING 17  --    CR PENDING 3.41* 4.57*   GLC PENDING 110* 85     Liver Function Tests  Recent Labs   Lab 11/08/19  1219   INR 1.35*     Pancreatic Enzymes  No lab results found in last 7 days.  Coagulation Profile  Recent Labs   Lab 11/08/19  1219   INR 1.35*   PTT 36     Lactate  Invalid input(s): LACTATE    IMAGING:  Recent Results (from the past 24 hour(s))   XR Abdomen Port 1  View    Narrative    EXAMINATION:  XR ABDOMEN PORT 1 VW 11/9/2019 11:52 PM.    COMPARISON: 3/24/2019.    HISTORY:  POD 1 PD catheter placement, increasing abdominal pain,  diffusely tender to mild palpation.    FINDINGS: AP supine view of the abdomen. Peritoneal dialysis catheter  is seen projecting over the right lower quadrant with tip not  visualized on this radiograph. Nonobstructive bowel gas pattern. No  pneumatosis or portal venous gas. Spinal catheter is are noted.  Degenerative changes of the spine and sacroiliac joints. Vascular  calcifications seen in the pelvis.      Impression    IMPRESSION: Nonobstructive bowel gas pattern. No pneumatosis. Right  lower quadrant peritoneal dialysis catheter with tip beyond the  field-of-view on this radiograph.    I have personally reviewed the examination and initial interpretation  and I agree with the findings.    NATASHA GARCIA MD

## 2022-11-29 DIAGNOSIS — I10 ESSENTIAL HYPERTENSION: ICD-10-CM

## 2022-11-29 RX ORDER — LISINOPRIL 10 MG/1
TABLET ORAL
Qty: 90 TABLET | Refills: 1 | Status: SHIPPED | OUTPATIENT
Start: 2022-11-29 | End: 2022-12-23 | Stop reason: SDUPTHER

## 2022-11-29 NOTE — TELEPHONE ENCOUNTER
No new care gaps identified.  [facility] Embedded Care Gaps. Reference number: [id number]. 11/29/2022   11:32:45 AM CST

## 2022-11-29 NOTE — TELEPHONE ENCOUNTER
Refill Authorization Note     Refill Decision Note   Rafael Banegas  is requesting a refill authorization.  Brief Assessment and Rationale for Refill:  Approve     Medication Therapy Plan:           Comments:     No Care Gaps recommended.     Note composed:5:04 PM 11/29/2022

## 2022-12-21 ENCOUNTER — OFFICE VISIT (OUTPATIENT)
Dept: OPHTHALMOLOGY | Facility: CLINIC | Age: 47
End: 2022-12-21
Payer: COMMERCIAL

## 2022-12-21 ENCOUNTER — PATIENT MESSAGE (OUTPATIENT)
Dept: OPHTHALMOLOGY | Facility: CLINIC | Age: 47
End: 2022-12-21

## 2022-12-21 DIAGNOSIS — Z01.00 ENCOUNTER FOR EYE EXAM: Primary | ICD-10-CM

## 2022-12-21 DIAGNOSIS — H52.7 REFRACTIVE ERRORS: ICD-10-CM

## 2022-12-21 PROCEDURE — 92015 DETERMINE REFRACTIVE STATE: CPT | Mod: S$GLB,,, | Performed by: OPTOMETRIST

## 2022-12-21 PROCEDURE — 92015 PR REFRACTION: ICD-10-PCS | Mod: S$GLB,,, | Performed by: OPTOMETRIST

## 2022-12-21 PROCEDURE — 3044F HG A1C LEVEL LT 7.0%: CPT | Mod: CPTII,S$GLB,, | Performed by: OPTOMETRIST

## 2022-12-21 PROCEDURE — 4010F ACE/ARB THERAPY RXD/TAKEN: CPT | Mod: CPTII,S$GLB,, | Performed by: OPTOMETRIST

## 2022-12-21 PROCEDURE — 99999 PR PBB SHADOW E&M-EST. PATIENT-LVL II: ICD-10-PCS | Mod: PBBFAC,,, | Performed by: OPTOMETRIST

## 2022-12-21 PROCEDURE — 4010F PR ACE/ARB THEARPY RXD/TAKEN: ICD-10-PCS | Mod: CPTII,S$GLB,, | Performed by: OPTOMETRIST

## 2022-12-21 PROCEDURE — 3044F PR MOST RECENT HEMOGLOBIN A1C LEVEL <7.0%: ICD-10-PCS | Mod: CPTII,S$GLB,, | Performed by: OPTOMETRIST

## 2022-12-21 PROCEDURE — 1159F MED LIST DOCD IN RCRD: CPT | Mod: CPTII,S$GLB,, | Performed by: OPTOMETRIST

## 2022-12-21 PROCEDURE — 99999 PR PBB SHADOW E&M-EST. PATIENT-LVL II: CPT | Mod: PBBFAC,,, | Performed by: OPTOMETRIST

## 2022-12-21 PROCEDURE — 92004 COMPRE OPH EXAM NEW PT 1/>: CPT | Mod: S$GLB,,, | Performed by: OPTOMETRIST

## 2022-12-21 PROCEDURE — 92004 PR EYE EXAM, NEW PATIENT,COMPREHESV: ICD-10-PCS | Mod: S$GLB,,, | Performed by: OPTOMETRIST

## 2022-12-21 PROCEDURE — 1159F PR MEDICATION LIST DOCUMENTED IN MEDICAL RECORD: ICD-10-PCS | Mod: CPTII,S$GLB,, | Performed by: OPTOMETRIST

## 2022-12-21 NOTE — PROGRESS NOTES
HPI     Annual Exam     Additional comments: New to [facility]  Pt states having trouble with distance and near [facility]  No drops or Sx on eyes           Comments    Vision changes since last eye exam?: yes     Any eye pain today: no    Other ocular symptoms: no    Interested in contact lens fitting today? no          Last edited by Lane Hong, OD on 12/21/2022  8:11 AM.            Assessment /Plan     For exam results, see Encounter Report.    Encounter for eye exam    Refractive errors      No pathology.    Dispense Final Rx for glasses.  RTC 1 year  Discussed above and answered questions.

## 2022-12-23 ENCOUNTER — OFFICE VISIT (OUTPATIENT)
Dept: INTERNAL MEDICINE | Facility: CLINIC | Age: 47
End: 2022-12-23
Payer: COMMERCIAL

## 2022-12-23 VITALS
HEART RATE: 92 BPM | WEIGHT: 200.81 LBS | TEMPERATURE: 97 F | SYSTOLIC BLOOD PRESSURE: 128 MMHG | OXYGEN SATURATION: 99 % | HEIGHT: 73 IN | BODY MASS INDEX: 26.62 KG/M2 | DIASTOLIC BLOOD PRESSURE: 72 MMHG

## 2022-12-23 DIAGNOSIS — Z00.00 PREVENTATIVE HEALTH CARE: ICD-10-CM

## 2022-12-23 DIAGNOSIS — R93.1 ELEVATED CORONARY ARTERY CALCIUM SCORE: ICD-10-CM

## 2022-12-23 DIAGNOSIS — I10 ESSENTIAL HYPERTENSION: Primary | ICD-10-CM

## 2022-12-23 PROCEDURE — 1159F PR MEDICATION LIST DOCUMENTED IN MEDICAL RECORD: ICD-10-PCS | Mod: CPTII,S$GLB,, | Performed by: INTERNAL MEDICINE

## 2022-12-23 PROCEDURE — 3044F PR MOST RECENT HEMOGLOBIN A1C LEVEL <7.0%: ICD-10-PCS | Mod: CPTII,S$GLB,, | Performed by: INTERNAL MEDICINE

## 2022-12-23 PROCEDURE — 3078F DIAST BP <80 MM HG: CPT | Mod: CPTII,S$GLB,, | Performed by: INTERNAL MEDICINE

## 2022-12-23 PROCEDURE — 3008F BODY MASS INDEX DOCD: CPT | Mod: CPTII,S$GLB,, | Performed by: INTERNAL MEDICINE

## 2022-12-23 PROCEDURE — 4010F ACE/ARB THERAPY RXD/TAKEN: CPT | Mod: CPTII,S$GLB,, | Performed by: INTERNAL MEDICINE

## 2022-12-23 PROCEDURE — 3074F SYST BP LT 130 MM HG: CPT | Mod: CPTII,S$GLB,, | Performed by: INTERNAL MEDICINE

## 2022-12-23 PROCEDURE — 99214 OFFICE O/P EST MOD 30 MIN: CPT | Mod: S$GLB,,, | Performed by: INTERNAL MEDICINE

## 2022-12-23 PROCEDURE — 3074F PR MOST RECENT SYSTOLIC BLOOD PRESSURE < 130 MM HG: ICD-10-PCS | Mod: CPTII,S$GLB,, | Performed by: INTERNAL MEDICINE

## 2022-12-23 PROCEDURE — 3044F HG A1C LEVEL LT 7.0%: CPT | Mod: CPTII,S$GLB,, | Performed by: INTERNAL MEDICINE

## 2022-12-23 PROCEDURE — 99999 PR PBB SHADOW E&M-EST. PATIENT-LVL IV: ICD-10-PCS | Mod: PBBFAC,,, | Performed by: INTERNAL MEDICINE

## 2022-12-23 PROCEDURE — 1159F MED LIST DOCD IN RCRD: CPT | Mod: CPTII,S$GLB,, | Performed by: INTERNAL MEDICINE

## 2022-12-23 PROCEDURE — 99214 PR OFFICE/OUTPT VISIT, EST, LEVL IV, 30-39 MIN: ICD-10-PCS | Mod: S$GLB,,, | Performed by: INTERNAL MEDICINE

## 2022-12-23 PROCEDURE — 99999 PR PBB SHADOW E&M-EST. PATIENT-LVL IV: CPT | Mod: PBBFAC,,, | Performed by: INTERNAL MEDICINE

## 2022-12-23 PROCEDURE — 3078F PR MOST RECENT DIASTOLIC BLOOD PRESSURE < 80 MM HG: ICD-10-PCS | Mod: CPTII,S$GLB,, | Performed by: INTERNAL MEDICINE

## 2022-12-23 PROCEDURE — 3008F PR BODY MASS INDEX (BMI) DOCUMENTED: ICD-10-PCS | Mod: CPTII,S$GLB,, | Performed by: INTERNAL MEDICINE

## 2022-12-23 PROCEDURE — 4010F PR ACE/ARB THEARPY RXD/TAKEN: ICD-10-PCS | Mod: CPTII,S$GLB,, | Performed by: INTERNAL MEDICINE

## 2022-12-23 RX ORDER — LISINOPRIL 10 MG/1
10 TABLET ORAL DAILY
Qty: 90 TABLET | Refills: 1 | Status: SHIPPED | OUTPATIENT
Start: 2022-12-23 | End: 2023-03-27 | Stop reason: SDUPTHER

## 2022-12-23 NOTE — PROGRESS NOTES
"Subjective:      Patient ID: Rafael Banegas is a 47 y.o. male.    Chief Complaint: Follow-up  Here for hypertension follow up. Compliant with medications. Bp has been high for a few weeks  HPI    46 yo with   Patient Active Problem List   Diagnosis    Primary hypertension    Environmental allergies    History of pulmonary embolism    History of laminectomy    Cervical radiculitis    Decreased range of motion with decreased strength    Encounter for colorectal cancer screening    Elevated coronary artery calcium score     Past Medical History:   Diagnosis Date    Cervical radiculitis 3/29/2022    Elevated coronary artery calcium score 12/23/2022    Hypertension      Here today for management of mult med problems as outlined below.  Compliant with meds without significant side effects. Energy and appetite good.       Review of Systems   Constitutional:  Negative for chills and fever.   HENT:  Negative for ear pain and sore throat.    Respiratory:  Negative for cough.    Cardiovascular:  Negative for chest pain.   Gastrointestinal:  Negative for abdominal pain and blood in stool.   Genitourinary:  Negative for dysuria and hematuria.   Neurological:  Negative for seizures and syncope.   Objective:   /72 (BP Location: Left arm, Patient Position: Sitting, BP Method: Large (Manual))   Pulse 92   Temp 96.5 °F (35.8 °C) (Tympanic)   Ht 6' 1" (1.854 m)   Wt 91.1 kg (200 lb 13.4 oz)   SpO2 99%   BMI 26.50 kg/m²     Physical Exam  Constitutional:       General: He is awake. He is not in acute distress.     Appearance: Normal appearance. He is well-developed.   HENT:      Head: Normocephalic and atraumatic.   Eyes:      Conjunctiva/sclera: Conjunctivae normal.   Cardiovascular:      Rate and Rhythm: Normal rate.   Pulmonary:      Effort: Pulmonary effort is normal.      Breath sounds: Normal breath sounds.   Musculoskeletal:      Cervical back: Normal range of motion.   Skin:     General: Skin is warm and dry. "   Neurological:      Mental Status: He is alert. Mental status is at baseline.   Psychiatric:         Mood and Affect: Mood normal.         Behavior: Behavior normal. Behavior is cooperative.         Thought Content: Thought content normal.         Judgment: Judgment normal.       Lab Results   Component Value Date    WBC 8.95 03/28/2022    HGB 14.8 03/28/2022    HCT 45.5 03/28/2022    MCV 93 03/28/2022     03/28/2022    CHOL 264 (H) 03/28/2022    TRIG 212 (H) 03/28/2022    HDL 33 (L) 03/28/2022    LDLCALC 188.6 (H) 03/28/2022    ALT 26 05/04/2022    AST 24 05/04/2022     03/28/2022    K 4.6 03/28/2022     03/28/2022    CO2 26 03/28/2022    BUN 21 (H) 03/28/2022    CREATININE 0.8 03/28/2022    TSH 1.695 03/28/2022    PSA 1.2 03/28/2022    GLU 84 03/28/2022    HGBA1C 5.6 03/28/2022          The 10-year ASCVD risk score (Bev AGUILAR, et al., 2019) is: 7.4%    Values used to calculate the score:      Age: 47 years      Sex: Male      Is Non- : No      Diabetic: No      Tobacco smoker: No      Systolic Blood Pressure: 128 mmHg      Is BP treated: Yes      HDL Cholesterol: 33 mg/dL      Total Cholesterol: 264 mg/dL     Assessment:     1. Essential hypertension    2. Elevated coronary artery calcium score    3. Preventative health care      Plan:   1. Essential hypertension  Controlled  Cont current meds.   -     Hypertension Digital Medicine (HDMP) Enrollment Order  -     Hypertension Digital Medicine (HDMP): Assign Onboarding Questionnaires  -     lisinopriL 10 MG tablet; Take 1 tablet (10 mg total) by mouth once daily.  Dispense: 90 tablet; Refill: 1    2. Elevated coronary artery calcium score  Overview:  66 2022, Declined statin    Discussed risks and benefits of statins.       3. Preventative health care  -     CBC Auto Differential; Future; Expected date: 03/23/2023  -     Comprehensive Metabolic Panel; Future; Expected date: 03/23/2023  -     TSH; Future; Expected date:  03/23/2023  -     PSA, Screening; Future; Expected date: 03/23/2023  -     Lipid Panel; Future; Expected date: 03/23/2023        Patient Instructions   Covid vaccine phone number is [phone].     Future Appointments   Date Time Provider Department Center   3/16/2023  8:10 AM LABORATORY, [facility] [place]   3/23/2023  3:00 PM Valdemar Mustafa MD [facility]           Follow up if symptoms worsen or fail to improve.

## 2023-03-16 ENCOUNTER — LAB VISIT (OUTPATIENT)
Dept: LAB | Facility: HOSPITAL | Age: 48
End: 2023-03-16
Attending: INTERNAL MEDICINE
Payer: COMMERCIAL

## 2023-03-16 DIAGNOSIS — Z00.00 PREVENTATIVE HEALTH CARE: ICD-10-CM

## 2023-03-16 LAB
ALBUMIN SERPL BCP-MCNC: 4.5 G/DL (ref 3.5–5.2)
ALP SERPL-CCNC: 55 U/L (ref 55–135)
ALT SERPL W/O P-5'-P-CCNC: 17 U/L (ref 10–44)
ANION GAP SERPL CALC-SCNC: 12 MMOL/L (ref 8–16)
AST SERPL-CCNC: 25 U/L (ref 10–40)
BASOPHILS # BLD AUTO: 0.03 K/UL (ref 0–0.2)
BASOPHILS NFR BLD: 0.4 % (ref 0–1.9)
BILIRUB SERPL-MCNC: 0.5 MG/DL (ref 0.1–1)
BUN SERPL-MCNC: 19 MG/DL (ref 6–20)
CALCIUM SERPL-MCNC: 10.1 MG/DL (ref 8.7–10.5)
CHLORIDE SERPL-SCNC: 105 MMOL/L (ref 95–110)
CHOLEST SERPL-MCNC: 269 MG/DL (ref 120–199)
CHOLEST/HDLC SERPL: 6.3 {RATIO} (ref 2–5)
CO2 SERPL-SCNC: 27 MMOL/L (ref 23–29)
COMPLEXED PSA SERPL-MCNC: 1 NG/ML (ref 0–4)
CREAT SERPL-MCNC: 0.8 MG/DL (ref 0.5–1.4)
DIFFERENTIAL METHOD: NORMAL
EOSINOPHIL # BLD AUTO: 0.3 K/UL (ref 0–0.5)
EOSINOPHIL NFR BLD: 3.5 % (ref 0–8)
ERYTHROCYTE [DISTWIDTH] IN BLOOD BY AUTOMATED COUNT: 13 % (ref 11.5–14.5)
EST. GFR  (NO RACE VARIABLE): >60 ML/MIN/1.73 M^2
GLUCOSE SERPL-MCNC: 84 MG/DL (ref 70–110)
HCT VFR BLD AUTO: 45.2 % (ref 40–54)
HDLC SERPL-MCNC: 43 MG/DL (ref 40–75)
HDLC SERPL: 16 % (ref 20–50)
HGB BLD-MCNC: 14.8 G/DL (ref 14–18)
IMM GRANULOCYTES # BLD AUTO: 0.02 K/UL (ref 0–0.04)
IMM GRANULOCYTES NFR BLD AUTO: 0.3 % (ref 0–0.5)
LDLC SERPL CALC-MCNC: 195.4 MG/DL (ref 63–159)
LYMPHOCYTES # BLD AUTO: 1.7 K/UL (ref 1–4.8)
LYMPHOCYTES NFR BLD: 23.7 % (ref 18–48)
MCH RBC QN AUTO: 30.6 PG (ref 27–31)
MCHC RBC AUTO-ENTMCNC: 32.7 G/DL (ref 32–36)
MCV RBC AUTO: 94 FL (ref 82–98)
MONOCYTES # BLD AUTO: 0.6 K/UL (ref 0.3–1)
MONOCYTES NFR BLD: 9 % (ref 4–15)
NEUTROPHILS # BLD AUTO: 4.5 K/UL (ref 1.8–7.7)
NEUTROPHILS NFR BLD: 63.1 % (ref 38–73)
NONHDLC SERPL-MCNC: 226 MG/DL
NRBC BLD-RTO: 0 /100 WBC
PLATELET # BLD AUTO: 327 K/UL (ref 150–450)
PMV BLD AUTO: 11.1 FL (ref 9.2–12.9)
POTASSIUM SERPL-SCNC: 4.6 MMOL/L (ref 3.5–5.1)
PROT SERPL-MCNC: 7.4 G/DL (ref 6–8.4)
RBC # BLD AUTO: 4.83 M/UL (ref 4.6–6.2)
SODIUM SERPL-SCNC: 144 MMOL/L (ref 136–145)
TRIGL SERPL-MCNC: 153 MG/DL (ref 30–150)
TSH SERPL DL<=0.005 MIU/L-ACNC: 1.21 UIU/ML (ref 0.4–4)
WBC # BLD AUTO: 7.12 K/UL (ref 3.9–12.7)

## 2023-03-16 PROCEDURE — 85025 COMPLETE CBC W/AUTO DIFF WBC: CPT | Performed by: INTERNAL MEDICINE

## 2023-03-16 PROCEDURE — 80053 COMPREHEN METABOLIC PANEL: CPT | Performed by: INTERNAL MEDICINE

## 2023-03-16 PROCEDURE — 84443 ASSAY THYROID STIM HORMONE: CPT | Performed by: INTERNAL MEDICINE

## 2023-03-16 PROCEDURE — 84153 ASSAY OF PSA TOTAL: CPT | Performed by: INTERNAL MEDICINE

## 2023-03-16 PROCEDURE — 36415 COLL VENOUS BLD VENIPUNCTURE: CPT | Performed by: INTERNAL MEDICINE

## 2023-03-16 PROCEDURE — 80061 LIPID PANEL: CPT | Performed by: INTERNAL MEDICINE

## 2023-03-27 ENCOUNTER — OFFICE VISIT (OUTPATIENT)
Dept: INTERNAL MEDICINE | Facility: CLINIC | Age: 48
End: 2023-03-27
Payer: COMMERCIAL

## 2023-03-27 VITALS
WEIGHT: 204.56 LBS | SYSTOLIC BLOOD PRESSURE: 118 MMHG | TEMPERATURE: 98 F | HEIGHT: 73 IN | DIASTOLIC BLOOD PRESSURE: 80 MMHG | HEART RATE: 86 BPM | OXYGEN SATURATION: 97 % | BODY MASS INDEX: 27.11 KG/M2

## 2023-03-27 DIAGNOSIS — Z01.84 ANTIBODY RESPONSE EXAM: ICD-10-CM

## 2023-03-27 DIAGNOSIS — Z00.00 ROUTINE GENERAL MEDICAL EXAMINATION AT A HEALTH CARE FACILITY: Primary | ICD-10-CM

## 2023-03-27 DIAGNOSIS — R93.1 ELEVATED CORONARY ARTERY CALCIUM SCORE: ICD-10-CM

## 2023-03-27 DIAGNOSIS — J30.2 SEASONAL ALLERGIES: ICD-10-CM

## 2023-03-27 DIAGNOSIS — Z23 NEED FOR HEPATITIS B VACCINATION: ICD-10-CM

## 2023-03-27 DIAGNOSIS — Z23 NEED FOR HEPATITIS A VACCINATION: ICD-10-CM

## 2023-03-27 DIAGNOSIS — I10 PRIMARY HYPERTENSION: ICD-10-CM

## 2023-03-27 DIAGNOSIS — E78.5 HYPERLIPIDEMIA, UNSPECIFIED HYPERLIPIDEMIA TYPE: ICD-10-CM

## 2023-03-27 DIAGNOSIS — I10 ESSENTIAL HYPERTENSION: ICD-10-CM

## 2023-03-27 PROCEDURE — 3008F BODY MASS INDEX DOCD: CPT | Mod: CPTII,S$GLB,, | Performed by: INTERNAL MEDICINE

## 2023-03-27 PROCEDURE — 90746 HEPB VACCINE 3 DOSE ADULT IM: CPT | Mod: S$GLB,,, | Performed by: INTERNAL MEDICINE

## 2023-03-27 PROCEDURE — 3079F DIAST BP 80-89 MM HG: CPT | Mod: CPTII,S$GLB,, | Performed by: INTERNAL MEDICINE

## 2023-03-27 PROCEDURE — 90471 IMMUNIZATION ADMIN: CPT | Mod: S$GLB,,, | Performed by: INTERNAL MEDICINE

## 2023-03-27 PROCEDURE — 1159F PR MEDICATION LIST DOCUMENTED IN MEDICAL RECORD: ICD-10-PCS | Mod: CPTII,S$GLB,, | Performed by: INTERNAL MEDICINE

## 2023-03-27 PROCEDURE — 90472 HEPATITIS B VACCINE ADULT IM: ICD-10-PCS | Mod: S$GLB,,, | Performed by: INTERNAL MEDICINE

## 2023-03-27 PROCEDURE — 99999 PR PBB SHADOW E&M-EST. PATIENT-LVL III: CPT | Mod: PBBFAC,,, | Performed by: INTERNAL MEDICINE

## 2023-03-27 PROCEDURE — 1159F MED LIST DOCD IN RCRD: CPT | Mod: CPTII,S$GLB,, | Performed by: INTERNAL MEDICINE

## 2023-03-27 PROCEDURE — 3074F SYST BP LT 130 MM HG: CPT | Mod: CPTII,S$GLB,, | Performed by: INTERNAL MEDICINE

## 2023-03-27 PROCEDURE — 90632 HEPATITIS A VACCINE ADULT IM: ICD-10-PCS | Mod: S$GLB,,, | Performed by: INTERNAL MEDICINE

## 2023-03-27 PROCEDURE — 3008F PR BODY MASS INDEX (BMI) DOCUMENTED: ICD-10-PCS | Mod: CPTII,S$GLB,, | Performed by: INTERNAL MEDICINE

## 2023-03-27 PROCEDURE — 3079F PR MOST RECENT DIASTOLIC BLOOD PRESSURE 80-89 MM HG: ICD-10-PCS | Mod: CPTII,S$GLB,, | Performed by: INTERNAL MEDICINE

## 2023-03-27 PROCEDURE — 99396 PREV VISIT EST AGE 40-64: CPT | Mod: 25,S$GLB,, | Performed by: INTERNAL MEDICINE

## 2023-03-27 PROCEDURE — 4010F ACE/ARB THERAPY RXD/TAKEN: CPT | Mod: CPTII,S$GLB,, | Performed by: INTERNAL MEDICINE

## 2023-03-27 PROCEDURE — 90471 HEPATITIS A VACCINE ADULT IM: ICD-10-PCS | Mod: S$GLB,,, | Performed by: INTERNAL MEDICINE

## 2023-03-27 PROCEDURE — 90632 HEPA VACCINE ADULT IM: CPT | Mod: S$GLB,,, | Performed by: INTERNAL MEDICINE

## 2023-03-27 PROCEDURE — 3074F PR MOST RECENT SYSTOLIC BLOOD PRESSURE < 130 MM HG: ICD-10-PCS | Mod: CPTII,S$GLB,, | Performed by: INTERNAL MEDICINE

## 2023-03-27 PROCEDURE — 99396 PR PREVENTIVE VISIT,EST,40-64: ICD-10-PCS | Mod: 25,S$GLB,, | Performed by: INTERNAL MEDICINE

## 2023-03-27 PROCEDURE — 90746 HEPATITIS B VACCINE ADULT IM: ICD-10-PCS | Mod: S$GLB,,, | Performed by: INTERNAL MEDICINE

## 2023-03-27 PROCEDURE — 99999 PR PBB SHADOW E&M-EST. PATIENT-LVL III: ICD-10-PCS | Mod: PBBFAC,,, | Performed by: INTERNAL MEDICINE

## 2023-03-27 PROCEDURE — 90472 IMMUNIZATION ADMIN EACH ADD: CPT | Mod: S$GLB,,, | Performed by: INTERNAL MEDICINE

## 2023-03-27 PROCEDURE — 4010F PR ACE/ARB THEARPY RXD/TAKEN: ICD-10-PCS | Mod: CPTII,S$GLB,, | Performed by: INTERNAL MEDICINE

## 2023-03-27 RX ORDER — LISINOPRIL 10 MG/1
10 TABLET ORAL DAILY
Qty: 90 TABLET | Refills: 1 | Status: SHIPPED | OUTPATIENT
Start: 2023-03-27 | End: 2024-02-09

## 2023-03-27 RX ORDER — ROSUVASTATIN CALCIUM 10 MG/1
10 TABLET, COATED ORAL DAILY
Qty: 90 TABLET | Refills: 1 | Status: SHIPPED | OUTPATIENT
Start: 2023-03-27 | End: 2023-10-09

## 2023-03-27 RX ORDER — FLUTICASONE PROPIONATE 50 MCG
2 SPRAY, SUSPENSION (ML) NASAL DAILY
Qty: 16 G | Refills: 3 | Status: SHIPPED | OUTPATIENT
Start: 2023-03-27 | End: 2023-04-26

## 2023-03-27 NOTE — PROGRESS NOTES
Subjective:      Patient ID: Rafael Banegas is a 47 y.o. male.    Chief Complaint: Follow-up    Follow-up  Pertinent negatives include no abdominal pain, chest pain, chills, coughing, fever or sore throat.       47 y.o. with  Patient Active Problem List   Diagnosis    Primary hypertension    Environmental allergies    History of pulmonary embolism    History of laminectomy    Cervical radiculitis    Decreased range of motion with decreased strength    Encounter for colorectal cancer screening    Elevated coronary artery calcium score    Routine general medical examination at a health care facility     Past Medical History:   Diagnosis Date    Cervical radiculitis 3/29/2022    Elevated coronary artery calcium score 12/23/2022    Hypertension        Here today for annual prev exam.  Compliant with meds without significant side effects. Energy and appetite are good.         Past Surgical History:   Procedure Laterality Date    COLONOSCOPY N/A 6/24/2022    Procedure: COLONOSCOPY;  Surgeon: Erica Mensah MD;  Location: [facility];  Service: Endoscopy;  Laterality: N/A;    LUMBAR LAMINECTOMY  2017     Social History     Socioeconomic History    Marital status: Single   Tobacco Use    Smoking status: Never    Smokeless tobacco: Never   Substance and Sexual Activity    Alcohol use: Yes     Alcohol/week: 3.0 standard drinks     Types: 1 Glasses of wine, 2 Cans of beer per week    Drug use: Never    Sexual activity: Yes     Partners: Female     family history includes Arthritis in his maternal grandmother; Cataracts in his maternal grandmother; Congenital heart disease in his maternal grandfather; Dementia in his father; Depression in his brother and mother; Diabetes in his paternal grandmother; Glaucoma in his paternal grandmother; Heart block in his father; Heart disease in his father; Hypertension in his mother; Stroke in his father.  Review of Systems   Constitutional:  Negative for chills and fever.   HENT:   "Negative for ear pain and sore throat.    Respiratory:  Negative for cough.    Cardiovascular:  Negative for chest pain.   Gastrointestinal:  Negative for abdominal pain and blood in stool.   Genitourinary:  Negative for dysuria and hematuria.   Neurological:  Negative for seizures and syncope.   Objective:   /80 (BP Location: Left arm, Patient Position: Sitting, BP Method: Large (Manual))   Pulse 86   Temp 97.9 °F (36.6 °C) (Tympanic)   Ht 6' 1" (1.854 m)   Wt 92.8 kg (204 lb 9.4 oz)   SpO2 97%   BMI 26.99 kg/m²     Physical Exam  Constitutional:       General: He is not in acute distress.     Appearance: He is well-developed.   HENT:      Head: Normocephalic and atraumatic.   Eyes:      Extraocular Movements: Extraocular movements intact.   Neck:      Thyroid: No thyromegaly.   Cardiovascular:      Rate and Rhythm: Normal rate and regular rhythm.   Pulmonary:      Breath sounds: Normal breath sounds. No wheezing or rales.   Abdominal:      General: Bowel sounds are normal.      Palpations: Abdomen is soft.      Tenderness: There is no abdominal tenderness.   Musculoskeletal:         General: No swelling.      Cervical back: Neck supple. No rigidity.   Lymphadenopathy:      Cervical: No cervical adenopathy.   Skin:     General: Skin is warm and dry.   Neurological:      Mental Status: He is alert and oriented to person, place, and time.   Psychiatric:         Behavior: Behavior normal.       Lab Results   Component Value Date    WBC 7.12 03/16/2023    HGB 14.8 03/16/2023    HGB 14.8 03/28/2022    HCT 45.2 03/16/2023    MCV 94 03/16/2023    MCV 93 03/28/2022     03/16/2023    CHOL 269 (H) 03/16/2023    TRIG 153 (H) 03/16/2023    HDL 43 03/16/2023    LDLCALC 195.4 (H) 03/16/2023    LDLCALC 188.6 (H) 03/28/2022    ALT 17 03/16/2023    AST 25 03/16/2023     03/16/2023    K 4.6 03/16/2023     03/16/2023    CO2 27 03/16/2023    BUN 19 03/16/2023    CREATININE 0.8 03/16/2023    CREATININE " 0.8 03/28/2022    EGFRNORACEVR >60.0 03/16/2023    TSH 1.205 03/16/2023    TSH 1.695 03/28/2022    PSA 1.0 03/16/2023    PSA 1.2 03/28/2022    GLU 84 03/16/2023    HGBA1C 5.6 03/28/2022          The 10-year ASCVD risk score (Bev AGUILAR, et al., 2019) is: 4.9%    Values used to calculate the score:      Age: 47 years      Sex: Male      Is Non- : No      Diabetic: No      Tobacco smoker: No      Systolic Blood Pressure: 118 mmHg      Is BP treated: Yes      HDL Cholesterol: 43 mg/dL      Total Cholesterol: 269 mg/dL     Assessment:     1. Routine general medical examination at a health care facility    2. Primary hypertension    3. Elevated coronary artery calcium score    4. Essential hypertension    5. Seasonal allergies    6. Hyperlipidemia, unspecified hyperlipidemia type    7. Need for hepatitis A vaccination    8. Need for hepatitis B vaccination    9. Antibody response exam      Plan:   1. Routine general medical examination at a health care facility  Overview:  Heart healthy diet, regular exercise, and regular use of sunscreen.    reviewed      2. Primary hypertension  Overview:  Controlled.  Continue current medications      3. Elevated coronary artery calcium score  Overview:  66 2022, statin initiated 3/2023      4. Essential hypertension  Controlled. Cont current meds.     -     lisinopriL 10 MG tablet; Take 1 tablet (10 mg total) by mouth once daily.  Dispense: 90 tablet; Refill: 1    5. Seasonal allergies  -     fluticasone propionate (FLONASE) 50 mcg/actuation nasal spray; 2 sprays (100 mcg total) by Each Nostril route once daily. For nasal congestion or runny nose  Dispense: 16 g; Refill: 3    6. Hyperlipidemia, unspecified hyperlipidemia type  Start atatin  -     ALT (SGPT); Future; Expected date: 06/27/2023  -     Lipid Panel; Future; Expected date: 06/25/2023    7. Need for hepatitis A vaccination  -     (In Office Administered) Hepatitis A Vaccine (Adult) (IM)    8. Need  for hepatitis B vaccination  -     (In Office Administered) Hepatitis B Vaccine (Adult) (IM)    9. Antibody response exam  -     Hepatitis B Surface Ab, Qualitative; Future; Expected date: 09/27/2023    Other orders  -     rosuvastatin (CRESTOR) 10 MG tablet; Take 1 tablet (10 mg total) by mouth once daily.  Dispense: 90 tablet; Refill: 1        There are no Patient Instructions on file for this visit.    Future Appointments   Date Time Provider Department Center   6/20/2023  8:05 AM LABORATORY, [facility] [place]   6/27/2023  8:20 AM Valdemar Mustafa MD [facility] [place]           Follow up in about 3 months (around 6/27/2023), or if symptoms worsen or fail to improve.

## 2023-05-02 ENCOUNTER — PATIENT MESSAGE (OUTPATIENT)
Dept: INTERNAL MEDICINE | Facility: CLINIC | Age: 48
End: 2023-05-02
Payer: COMMERCIAL

## 2023-06-20 ENCOUNTER — LAB VISIT (OUTPATIENT)
Dept: LAB | Facility: HOSPITAL | Age: 48
End: 2023-06-20
Attending: INTERNAL MEDICINE
Payer: COMMERCIAL

## 2023-06-20 DIAGNOSIS — E78.5 HYPERLIPIDEMIA, UNSPECIFIED HYPERLIPIDEMIA TYPE: ICD-10-CM

## 2023-06-20 DIAGNOSIS — Z01.84 ANTIBODY RESPONSE EXAM: ICD-10-CM

## 2023-06-20 LAB
ALT SERPL W/O P-5'-P-CCNC: 15 U/L (ref 10–44)
CHOLEST SERPL-MCNC: 213 MG/DL (ref 120–199)
CHOLEST/HDLC SERPL: 4.7 {RATIO} (ref 2–5)
HBV SURFACE AB SER-ACNC: 354.34 MIU/ML
HBV SURFACE AB SER-ACNC: REACTIVE M[IU]/ML
HDLC SERPL-MCNC: 45 MG/DL (ref 40–75)
HDLC SERPL: 21.1 % (ref 20–50)
LDLC SERPL CALC-MCNC: 140.4 MG/DL (ref 63–159)
NONHDLC SERPL-MCNC: 168 MG/DL
TRIGL SERPL-MCNC: 138 MG/DL (ref 30–150)

## 2023-06-20 PROCEDURE — 86706 HEP B SURFACE ANTIBODY: CPT | Mod: 91 | Performed by: INTERNAL MEDICINE

## 2023-06-20 PROCEDURE — 36415 COLL VENOUS BLD VENIPUNCTURE: CPT | Performed by: INTERNAL MEDICINE

## 2023-06-20 PROCEDURE — 80061 LIPID PANEL: CPT | Performed by: INTERNAL MEDICINE

## 2023-06-20 PROCEDURE — 84460 ALANINE AMINO (ALT) (SGPT): CPT | Performed by: INTERNAL MEDICINE

## 2023-06-26 PROBLEM — Z00.00 ROUTINE GENERAL MEDICAL EXAMINATION AT A HEALTH CARE FACILITY: Status: RESOLVED | Noted: 2023-03-27 | Resolved: 2023-06-26

## 2023-10-09 RX ORDER — ROSUVASTATIN CALCIUM 10 MG/1
10 TABLET, COATED ORAL
Qty: 90 TABLET | Refills: 1 | Status: SHIPPED | OUTPATIENT
Start: 2023-10-09 | End: 2024-02-19

## 2023-10-09 NOTE — TELEPHONE ENCOUNTER
Refill Decision Note   Rafael Banegas  is requesting a refill authorization.  Brief Assessment and Rationale for Refill:  Approve     Medication Therapy Plan:         Comments:     Note composed:3:19 PM 10/09/2023             Appointments     Last Visit   3/27/2023 Valdemar Mustafa MD   Next Visit   Visit date not found Valdemar Mustafa MD

## 2023-10-09 NOTE — TELEPHONE ENCOUNTER
No care due was identified.  Health [facility] Embedded Care Due Messages. Reference number: [id number].   10/09/2023 11:52:35 AM CDT

## 2024-01-26 ENCOUNTER — OFFICE VISIT (OUTPATIENT)
Dept: INTERNAL MEDICINE | Facility: CLINIC | Age: 49
End: 2024-01-26
Payer: COMMERCIAL

## 2024-01-26 VITALS
HEART RATE: 78 BPM | DIASTOLIC BLOOD PRESSURE: 84 MMHG | TEMPERATURE: 97 F | WEIGHT: 223.75 LBS | OXYGEN SATURATION: 98 % | BODY MASS INDEX: 29.65 KG/M2 | HEIGHT: 73 IN | SYSTOLIC BLOOD PRESSURE: 120 MMHG

## 2024-01-26 DIAGNOSIS — J32.9 SINUSITIS, UNSPECIFIED CHRONICITY, UNSPECIFIED LOCATION: Primary | ICD-10-CM

## 2024-01-26 DIAGNOSIS — R09.81 NASAL CONGESTION: ICD-10-CM

## 2024-01-26 LAB
CTP QC/QA: YES
SARS-COV-2 RDRP RESP QL NAA+PROBE: NEGATIVE

## 2024-01-26 PROCEDURE — 87635 SARS-COV-2 COVID-19 AMP PRB: CPT | Mod: QW,S$GLB,, | Performed by: INTERNAL MEDICINE

## 2024-01-26 PROCEDURE — 99214 OFFICE O/P EST MOD 30 MIN: CPT | Mod: S$GLB,,, | Performed by: INTERNAL MEDICINE

## 2024-01-26 PROCEDURE — 3008F BODY MASS INDEX DOCD: CPT | Mod: CPTII,S$GLB,, | Performed by: INTERNAL MEDICINE

## 2024-01-26 PROCEDURE — 1159F MED LIST DOCD IN RCRD: CPT | Mod: CPTII,S$GLB,, | Performed by: INTERNAL MEDICINE

## 2024-01-26 PROCEDURE — 3079F DIAST BP 80-89 MM HG: CPT | Mod: CPTII,S$GLB,, | Performed by: INTERNAL MEDICINE

## 2024-01-26 PROCEDURE — 3074F SYST BP LT 130 MM HG: CPT | Mod: CPTII,S$GLB,, | Performed by: INTERNAL MEDICINE

## 2024-01-26 PROCEDURE — 99999 PR PBB SHADOW E&M-EST. PATIENT-LVL IV: CPT | Mod: PBBFAC,,, | Performed by: INTERNAL MEDICINE

## 2024-01-26 RX ORDER — FLUTICASONE PROPIONATE 50 MCG
2 SPRAY, SUSPENSION (ML) NASAL DAILY
Qty: 16 G | Refills: 0 | Status: SHIPPED | OUTPATIENT
Start: 2024-01-26 | End: 2024-02-20

## 2024-01-26 RX ORDER — METHYLPREDNISOLONE 4 MG/1
TABLET ORAL
Qty: 1 EACH | Refills: 0 | Status: SHIPPED | OUTPATIENT
Start: 2024-01-26 | End: 2024-06-06

## 2024-01-26 RX ORDER — AMOXICILLIN 875 MG/1
875 TABLET, FILM COATED ORAL EVERY 12 HOURS
Qty: 14 TABLET | Refills: 0 | Status: SHIPPED | OUTPATIENT
Start: 2024-01-26 | End: 2024-06-06

## 2024-01-26 NOTE — PROGRESS NOTES
"Subjective:      Patient ID: Rafael Banegas is a 48 y.o. male.    Chief Complaint: Sinus Problem    Sinus Problem  This is a new problem. The current episode started in the past 7 days. The problem has been gradually improving since onset. There has been no fever. He is experiencing no pain. Associated symptoms include congestion, coughing (Rare) and sinus pressure. Pertinent negatives include no chills, diaphoresis, ear pain, headaches, neck pain or shortness of breath. (Yellow/green postnasal drip. Body aches) Treatments tried: [place]Hug[place]ot cold. The treatment provided mild relief.       47 yo with   Patient Active Problem List   Diagnosis    Primary hypertension    Environmental allergies    History of pulmonary embolism    History of laminectomy    Cervical radiculitis    Decreased range of motion with decreased strength    Encounter for colorectal cancer screening    Elevated coronary artery calcium score     Past Medical History:   Diagnosis Date    Cervical radiculitis 3/29/2022    Elevated coronary artery calcium score 12/23/2022    Hypertension      Here today c/o sinus problem      Review of Systems   Constitutional:  Negative for chills, diaphoresis and fever.   HENT:  Positive for congestion and sinus pressure. Negative for ear pain and postnasal drip.    Respiratory:  Positive for cough (Rare). Negative for shortness of breath and wheezing.    Cardiovascular:  Negative for chest pain and palpitations.   Musculoskeletal:  Negative for neck pain.   Skin:  Negative for rash and wound.   Neurological:  Negative for headaches.     Objective:   /84 (BP Location: Left arm, Patient Position: Sitting, BP Method: Large (Manual))   Pulse 78   Temp 97.1 °F (36.2 °C) (Tympanic)   Ht 6' 0.99" (1.854 m)   Wt 101.5 kg (223 lb 12.3 oz)   SpO2 98%   BMI 29.53 kg/m²     Physical Exam  Constitutional:       General: He is not in acute distress.     Appearance: He is well-developed.   HENT:      Right Ear: " Tympanic membrane normal.      Left Ear: Tympanic membrane normal.      Nose: Mucosal edema and rhinorrhea present.      Right Nostril: No epistaxis.      Left Nostril: No epistaxis.      Right Turbinates: Swollen.      Left Turbinates: Swollen.      Right Sinus: No maxillary sinus tenderness or frontal sinus tenderness.      Left Sinus: No maxillary sinus tenderness or frontal sinus tenderness.      Mouth/Throat:      Pharynx: Posterior oropharyngeal erythema present. No oropharyngeal exudate.   Eyes:      Conjunctiva/sclera: Conjunctivae normal.      Pupils: Pupils are equal, round, and reactive to light.   Cardiovascular:      Rate and Rhythm: Normal rate.   Pulmonary:      Effort: Pulmonary effort is normal.      Breath sounds: Normal breath sounds.   Lymphadenopathy:      Cervical: No cervical adenopathy.   Skin:     General: Skin is warm and dry.      Findings: No rash.   Neurological:      Mental Status: He is alert and oriented to person, place, and time.   Psychiatric:         Behavior: Behavior normal.         Lab Results   Component Value Date    WBC 7.12 03/16/2023    HGB 14.8 03/16/2023    HGB 14.8 03/28/2022    HCT 45.2 03/16/2023    MCV 94 03/16/2023    MCV 93 03/28/2022     03/16/2023    CHOL 213 (H) 06/20/2023    TRIG 138 06/20/2023    HDL 45 06/20/2023    LDLCALC 140.4 06/20/2023    LDLCALC 195.4 (H) 03/16/2023    LDLCALC 188.6 (H) 03/28/2022    ALT 15 06/20/2023    AST 25 03/16/2023     03/16/2023    K 4.6 03/16/2023    CALCIUM 10.1 03/16/2023     03/16/2023    CO2 27 03/16/2023    BUN 19 03/16/2023    CREATININE 0.8 03/16/2023    CREATININE 0.8 03/28/2022    EGFRNORACEVR >60.0 03/16/2023    TSH 1.205 03/16/2023    TSH 1.695 03/28/2022    PSA 1.0 03/16/2023    PSA 1.2 03/28/2022    GLU 84 03/16/2023    HGBA1C 5.6 03/28/2022          The 10-year ASCVD risk score (Bev AGUILAR, et al., 2019) is: 3.6%    Values used to calculate the score:      Age: 48 years      Sex: Male      Is  Non- : No      Diabetic: No      Tobacco smoker: No      Systolic Blood Pressure: 120 mmHg      Is BP treated: Yes      HDL Cholesterol: 45 mg/dL      Total Cholesterol: 213 mg/dL     Assessment:     1. Sinusitis, unspecified chronicity, unspecified location    2. Nasal congestion      Plan:   1. Sinusitis, unspecified chronicity, unspecified location  -     amoxicillin (AMOXIL) 875 MG tablet; Take 1 tablet (875 mg total) by mouth every 12 (twelve) hours.  Dispense: 14 tablet; Refill: 0  -     methylPREDNISolone (MEDROL, AFSHIN,) 4 mg tablet; use as directed  Dispense: 1 each; Refill: 0  -     fluticasone propionate (FLONASE) 50 mcg/actuation nasal spray; 2 sprays (100 mcg total) by Each Nostril route once daily.  Dispense: 16 g; Refill: 0    2. Nasal congestion  -     POCT COVID-19 Rapid Screening        Patient Instructions   flonase nasal spray 2 spays each nostril for nasal congestion, post nasal drip, runny nose    Delsym as needed for cough    Saline nasal spray during the day for nasal congestion.     Allegra or zyrtec for allergies, post nasal drip, runny nose, watery eyes.    cepacol throat lozenges and/or chloraseptic spray for sore throat    mucinex for chest congestion.     Tylenol or ibuprofen for pain or fever.        No future appointments.        No follow-ups on file.

## 2024-01-26 NOTE — PATIENT INSTRUCTIONS
flonase nasal spray 2 spays each nostril for nasal congestion, post nasal drip, runny nose    Delsym as needed for cough    Saline nasal spray during the day for nasal congestion.     Allegra or zyrtec for allergies, post nasal drip, runny nose, watery eyes.    cepacol throat lozenges and/or chloraseptic spray for sore throat    mucinex for chest congestion.     Tylenol or ibuprofen for pain or fever.

## 2024-02-09 DIAGNOSIS — I10 ESSENTIAL HYPERTENSION: ICD-10-CM

## 2024-02-09 RX ORDER — LISINOPRIL 10 MG/1
10 TABLET ORAL
Qty: 90 TABLET | Refills: 0 | Status: SHIPPED | OUTPATIENT
Start: 2024-02-09 | End: 2024-05-21 | Stop reason: SDUPTHER

## 2024-02-09 NOTE — TELEPHONE ENCOUNTER
Provider Staff:  Action required for this patient    Requires labs      Please see care gap opportunities below in Care Due Message.    Thanks!  [facility]     Appointments      Date Provider   Last Visit   1/26/2024 Valdemar Mustafa MD   Next Visit   Visit date not found Valdemar Mustafa MD     Refill Decision Note   Rafael Banegas  is requesting a refill authorization.    Brief Assessment and Rationale for Refill:  Approve       Medication Therapy Plan:         Comments:     Note composed:12:48 PM 02/09/2024

## 2024-02-09 NOTE — TELEPHONE ENCOUNTER
----- Message from Amparo Jorge sent at 2/9/2024 11:22 AM CST -----  States he is having trouble with the pharmacy getting his lisinopril.    Type:  RX Refill Request    Who Called: pt  Refill or New Rx:Refill  RX Name and Strength:lisinopril  How is the patient currently taking it? (ex. 1XDay):1XDay  Is this a 30 day or 90 day RX:90  Preferred Pharmacy with phone number:.  [id number] - [address] & [address] [address]  Phone: [phone] Fax: [phone]  Local or Mail Order:local  Ordering Provider:Dr Mustafa   Would the patient rather a call back or a response via [facility]? Call   Best Call Back Number: [phone]  Additional Information: .    Thank you

## 2024-02-09 NOTE — TELEPHONE ENCOUNTER
Care Due:                  Date            Visit Type   Department     Provider  --------------------------------------------------------------------------------                                EP -                              PRIMARY      [facility] INTERNAL  Last Visit: 01-      CARE ([facility])   MEDICINE       Valdemar Mustafa  Next Visit: None Scheduled  None         None Found                                                            Last  Test          Frequency    Reason                     Performed    Due Date  --------------------------------------------------------------------------------    CMP.........  12 months..  lisinopriL, rosuvastatin.  03- 03-    [facility] Embedded Care Due Messages. Reference number: [id number].   2/09/2024 11:18:47 AM CST

## 2024-02-19 RX ORDER — ROSUVASTATIN CALCIUM 10 MG/1
10 TABLET, COATED ORAL
Qty: 90 TABLET | Refills: 0 | Status: SHIPPED | OUTPATIENT
Start: 2024-02-19 | End: 2024-06-03 | Stop reason: SDUPTHER

## 2024-02-19 NOTE — TELEPHONE ENCOUNTER
No care due was identified.  Health [facility] Embedded Care Due Messages. Reference number: [id number].   2/19/2024 11:39:58 AM CST

## 2024-02-19 NOTE — TELEPHONE ENCOUNTER
Refill Decision Note   Rafael Bassam  is requesting a refill authorization.  Brief Assessment and Rationale for Refill:  Approve     Medication Therapy Plan:         Comments:     Note composed:3:35 PM 02/19/2024

## 2024-02-20 DIAGNOSIS — J32.9 SINUSITIS, UNSPECIFIED CHRONICITY, UNSPECIFIED LOCATION: ICD-10-CM

## 2024-02-20 RX ORDER — FLUTICASONE PROPIONATE 50 MCG
SPRAY, SUSPENSION (ML) NASAL
Qty: 48 G | Refills: 3 | Status: SHIPPED | OUTPATIENT
Start: 2024-02-20 | End: 2024-05-21 | Stop reason: SDUPTHER

## 2024-02-20 NOTE — TELEPHONE ENCOUNTER
No care due was identified.  Health [facility] Embedded Care Due Messages. Reference number: [id number].   2/20/2024 11:55:03 AM CST

## 2024-03-18 ENCOUNTER — PATIENT MESSAGE (OUTPATIENT)
Dept: FAMILY MEDICINE | Facility: CLINIC | Age: 49
End: 2024-03-18

## 2024-03-20 DIAGNOSIS — I10 PRIMARY HYPERTENSION: ICD-10-CM

## 2024-05-21 DIAGNOSIS — J32.9 SINUSITIS, UNSPECIFIED CHRONICITY, UNSPECIFIED LOCATION: ICD-10-CM

## 2024-05-21 DIAGNOSIS — I10 ESSENTIAL HYPERTENSION: ICD-10-CM

## 2024-05-21 RX ORDER — LISINOPRIL 10 MG/1
10 TABLET ORAL DAILY
Qty: 90 TABLET | Refills: 0 | Status: SHIPPED | OUTPATIENT
Start: 2024-05-21 | End: 2024-06-06 | Stop reason: SDUPTHER

## 2024-05-21 RX ORDER — FLUTICASONE PROPIONATE 50 MCG
2 SPRAY, SUSPENSION (ML) NASAL NIGHTLY
Qty: 48 G | Refills: 0 | Status: SHIPPED | OUTPATIENT
Start: 2024-05-21

## 2024-05-21 NOTE — TELEPHONE ENCOUNTER
Care Due:                  Date            Visit Type   Department     Provider  --------------------------------------------------------------------------------                                EP -                              PRIMARY      [facility] INTERNAL  Last Visit: 01-      CARE ([facility])   MEDICINE       Valdemar Mustafa  Next Visit: None Scheduled  None         None Found                                                            Last  Test          Frequency    Reason                     Performed    Due Date  --------------------------------------------------------------------------------    CMP.........  12 months..  lisinopriL, rosuvastatin.  03- 03-    Lipid Panel.  12 months..  rosuvastatin.............  06- 06-    Health [facility] Embedded Care Due Messages. Reference number: [id number].   5/21/2024 9:19:50 AM CDT

## 2024-06-03 RX ORDER — ROSUVASTATIN CALCIUM 10 MG/1
10 TABLET, COATED ORAL DAILY
Qty: 90 TABLET | Refills: 0 | Status: SHIPPED | OUTPATIENT
Start: 2024-06-03 | End: 2024-06-06 | Stop reason: SDUPTHER

## 2024-06-03 NOTE — TELEPHONE ENCOUNTER
No care due was identified.  [facility] Embedded Care Due Messages. Reference number: [id number].   6/03/2024 1:41:52 PM CDT

## 2024-06-06 ENCOUNTER — OFFICE VISIT (OUTPATIENT)
Dept: INTERNAL MEDICINE | Facility: CLINIC | Age: 49
End: 2024-06-06
Payer: COMMERCIAL

## 2024-06-06 VITALS
HEIGHT: 73 IN | OXYGEN SATURATION: 98 % | DIASTOLIC BLOOD PRESSURE: 86 MMHG | SYSTOLIC BLOOD PRESSURE: 130 MMHG | TEMPERATURE: 98 F | WEIGHT: 221.31 LBS | HEART RATE: 98 BPM | BODY MASS INDEX: 29.33 KG/M2

## 2024-06-06 DIAGNOSIS — I10 PRIMARY HYPERTENSION: ICD-10-CM

## 2024-06-06 DIAGNOSIS — R93.1 ELEVATED CORONARY ARTERY CALCIUM SCORE: ICD-10-CM

## 2024-06-06 DIAGNOSIS — Z00.00 ROUTINE GENERAL MEDICAL EXAMINATION AT A HEALTH CARE FACILITY: Primary | ICD-10-CM

## 2024-06-06 DIAGNOSIS — I10 ESSENTIAL HYPERTENSION: ICD-10-CM

## 2024-06-06 PROCEDURE — 3079F DIAST BP 80-89 MM HG: CPT | Mod: CPTII,S$GLB,, | Performed by: INTERNAL MEDICINE

## 2024-06-06 PROCEDURE — 4010F ACE/ARB THERAPY RXD/TAKEN: CPT | Mod: CPTII,S$GLB,, | Performed by: INTERNAL MEDICINE

## 2024-06-06 PROCEDURE — 99396 PREV VISIT EST AGE 40-64: CPT | Mod: S$GLB,,, | Performed by: INTERNAL MEDICINE

## 2024-06-06 PROCEDURE — 3075F SYST BP GE 130 - 139MM HG: CPT | Mod: CPTII,S$GLB,, | Performed by: INTERNAL MEDICINE

## 2024-06-06 PROCEDURE — 3008F BODY MASS INDEX DOCD: CPT | Mod: CPTII,S$GLB,, | Performed by: INTERNAL MEDICINE

## 2024-06-06 PROCEDURE — 99999 PR PBB SHADOW E&M-EST. PATIENT-LVL III: CPT | Mod: PBBFAC,,, | Performed by: INTERNAL MEDICINE

## 2024-06-06 PROCEDURE — 1159F MED LIST DOCD IN RCRD: CPT | Mod: CPTII,S$GLB,, | Performed by: INTERNAL MEDICINE

## 2024-06-06 RX ORDER — ROSUVASTATIN CALCIUM 10 MG/1
10 TABLET, COATED ORAL DAILY
Qty: 30 EACH | Refills: 0 | Status: SHIPPED | OUTPATIENT
Start: 2024-06-06 | End: 2024-06-12 | Stop reason: DRUGHIGH

## 2024-06-06 RX ORDER — LISINOPRIL 10 MG/1
10 TABLET ORAL DAILY
Qty: 60 TABLET | Refills: 0 | Status: SHIPPED | OUTPATIENT
Start: 2024-06-06

## 2024-06-06 NOTE — PROGRESS NOTES
Subjective:      Patient ID: Rafael Banegas is a 48 y.o. male.    Chief Complaint: Annual Exam    HPI      48 y.o. with  Patient Active Problem List   Diagnosis    Primary hypertension    Environmental allergies    History of pulmonary embolism    History of laminectomy    Cervical radiculitis    Decreased range of motion with decreased strength    Encounter for colorectal cancer screening    Elevated coronary artery calcium score    Routine general medical examination at a health care facility     Past Medical History:   Diagnosis Date    Cervical radiculitis 3/29/2022    Elevated coronary artery calcium score 12/23/2022    Hypertension        Here today for annual prev exam.  Compliant with meds without significant side effects. Energy and appetite are good.         Past Surgical History:   Procedure Laterality Date    COLONOSCOPY N/A 06/24/2022    Procedure: COLONOSCOPY;  Surgeon: Erica Mensah MD;  Location: [facility];  Service: Endoscopy;  Laterality: N/A;    LUMBAR LAMINECTOMY  2017     Social History     Socioeconomic History    Marital status: Single   Tobacco Use    Smoking status: Some Days     Types: Cigars    Smokeless tobacco: Never   Substance and Sexual Activity    Alcohol use: Yes     Alcohol/week: 3.0 standard drinks of alcohol     Types: 1 Glasses of wine, 2 Cans of beer per week    Drug use: Never    Sexual activity: Yes     Partners: Male     Social Determinants of Health     Financial Resource Strain: Low Risk  (6/5/2024)    Overall Financial Resource Strain (CARDIA)     Difficulty of Paying Living Expenses: Not hard at all   Food Insecurity: No Food Insecurity (6/5/2024)    Hunger Vital Sign     Worried About Running Out of Food in the Last Year: Never true     Ran Out of Food in the Last Year: Never true   Physical Activity: Sufficiently Active (6/5/2024)    Exercise Vital Sign     Days of Exercise per Week: 3 days     Minutes of Exercise per Session: 50 min   Stress: No Stress Concern  "Present (6/5/2024)    [employer] - Occupational Stress Questionnaire     Feeling of Stress : Only a little   Housing Stability: Unknown (6/5/2024)    Housing Stability Vital Sign     Unable to Pay for Housing in the Last Year: No     family history includes Arthritis in his maternal grandmother; Cataracts in his maternal grandmother; Congenital heart disease in his maternal grandfather; Dementia in his father; Depression in his brother and mother; Diabetes in his paternal grandmother; Glaucoma in his paternal grandmother; Heart block in his father; Heart disease in his father; Hypertension in his mother; Stroke in his father.  Review of Systems   Constitutional:  Negative for activity change and unexpected weight change.   HENT:  Negative for hearing loss, rhinorrhea and trouble swallowing.    Eyes:  Negative for discharge.   Respiratory:  Negative for chest tightness and wheezing.    Cardiovascular:  Negative for chest pain and palpitations.   Gastrointestinal:  Negative for blood in stool, constipation, diarrhea and vomiting.   Endocrine: Negative for polydipsia and polyuria.   Genitourinary:  Negative for difficulty urinating, hematuria and urgency.   Musculoskeletal:  Negative for arthralgias, joint swelling and neck pain.   Neurological:  Negative for weakness and headaches.   Psychiatric/Behavioral:  Negative for confusion and dysphoric mood.      Objective:   /86 (BP Location: Right arm, Patient Position: Sitting, BP Method: Large (Manual))   Pulse 98   Temp 97.9 °F (36.6 °C) (Tympanic)   Ht 6' 0.99" (1.854 m)   Wt 100.4 kg (221 lb 5.5 oz)   SpO2 98%   BMI 29.21 kg/m²     Physical Exam  Constitutional:       General: He is not in acute distress.     Appearance: He is well-developed.   HENT:      Head: Normocephalic and atraumatic.   Eyes:      Extraocular Movements: Extraocular movements intact.   Neck:      Thyroid: No thyromegaly.   Cardiovascular:      Rate and " Rhythm: Normal rate and regular rhythm.   Pulmonary:      Breath sounds: Normal breath sounds. No wheezing or rales.   Abdominal:      General: Bowel sounds are normal.      Palpations: Abdomen is soft.      Tenderness: There is no abdominal tenderness.   Musculoskeletal:         General: No swelling.      Cervical back: Neck supple. No rigidity.   Lymphadenopathy:      Cervical: No cervical adenopathy.   Skin:     General: Skin is warm and dry.   Neurological:      Mental Status: He is alert and oriented to person, place, and time.   Psychiatric:         Behavior: Behavior normal.         Lab Results   Component Value Date    WBC 7.12 03/16/2023    HGB 14.8 03/16/2023    HGB 14.8 03/28/2022    HCT 45.2 03/16/2023    MCV 94 03/16/2023    MCV 93 03/28/2022     03/16/2023    CHOL 213 (H) 06/20/2023    TRIG 138 06/20/2023    HDL 45 06/20/2023    LDLCALC 140.4 06/20/2023    LDLCALC 195.4 (H) 03/16/2023    LDLCALC 188.6 (H) 03/28/2022    ALT 15 06/20/2023    AST 25 03/16/2023     03/16/2023    K 4.6 03/16/2023    CALCIUM 10.1 03/16/2023     03/16/2023    CO2 27 03/16/2023    BUN 19 03/16/2023    CREATININE 0.8 03/16/2023    CREATININE 0.8 03/28/2022    EGFRNORACEVR >60.0 03/16/2023    TSH 1.205 03/16/2023    TSH 1.695 03/28/2022    PSA 1.0 03/16/2023    PSA 1.2 03/28/2022    GLU 84 03/16/2023    HGBA1C 5.6 03/28/2022          The 10-year ASCVD risk score (Bev DK, et al., 2019) is: 9.9%    Values used to calculate the score:      Age: 48 years      Sex: Male      Is Non- : No      Diabetic: No      Tobacco smoker: Yes      Systolic Blood Pressure: 130 mmHg      Is BP treated: Yes      HDL Cholesterol: 45 mg/dL      Total Cholesterol: 213 mg/dL     Assessment:     1. Routine general medical examination at a health care facility    2. Elevated coronary artery calcium score    3. Primary hypertension    4. Essential hypertension      Plan:   1. Routine general medical examination  at a health care facility  Overview:  Heart healthy diet, regular exercise, and regular use of sunscreen.    reviewed    Orders:  -     rosuvastatin (CRESTOR) 10 MG tablet; Take 1 tablet (10 mg total) by mouth once daily.  Dispense: 30 each; Refill: 0  -     Comprehensive Metabolic Panel; Future; Expected date: 06/06/2024  -     CBC Auto Differential; Future; Expected date: 06/06/2024  -     TSH; Future; Expected date: 06/06/2024  -     Lipid Panel; Future; Expected date: 06/06/2024  -     Hemoglobin A1C; Future; Expected date: 06/06/2024  -     PSA, Screening; Future; Expected date: 06/06/2024    2. Elevated coronary artery calcium score  Overview:  66 2022, statin initiated 3/2023      3. Primary hypertension  Overview:  Controlled.  Continue current medications      4. Essential hypertension  -     lisinopriL 10 MG tablet; Take 1 tablet (10 mg total) by mouth once daily.  Dispense: 60 tablet; Refill: 0        There are no Patient Instructions on file for this visit.    Future Appointments   Date Time Provider Department Center   6/8/2024  9:00 AM LABORATORY, [facility]   6/6/2025  3:20 PM [facility]       Lab Frequency Next Occurrence   Comprehensive Metabolic Panel Once 03/20/2024       Follow up in about 1 year (around 6/6/2025), or if symptoms worsen or fail to improve, for fasting labs next avail.

## 2024-06-08 ENCOUNTER — LAB VISIT (OUTPATIENT)
Dept: LAB | Facility: HOSPITAL | Age: 49
End: 2024-06-08
Attending: INTERNAL MEDICINE
Payer: COMMERCIAL

## 2024-06-08 DIAGNOSIS — Z00.00 ROUTINE GENERAL MEDICAL EXAMINATION AT A HEALTH CARE FACILITY: ICD-10-CM

## 2024-06-08 LAB
ALBUMIN SERPL BCP-MCNC: 4 G/DL (ref 3.5–5.2)
ALP SERPL-CCNC: 49 U/L (ref 55–135)
ALT SERPL W/O P-5'-P-CCNC: 20 U/L (ref 10–44)
ANION GAP SERPL CALC-SCNC: 8 MMOL/L (ref 8–16)
AST SERPL-CCNC: 30 U/L (ref 10–40)
BASOPHILS # BLD AUTO: 0.02 K/UL (ref 0–0.2)
BASOPHILS NFR BLD: 0.4 % (ref 0–1.9)
BILIRUB SERPL-MCNC: 0.6 MG/DL (ref 0.1–1)
BUN SERPL-MCNC: 13 MG/DL (ref 6–20)
CALCIUM SERPL-MCNC: 9.5 MG/DL (ref 8.7–10.5)
CHLORIDE SERPL-SCNC: 107 MMOL/L (ref 95–110)
CHOLEST SERPL-MCNC: 183 MG/DL (ref 120–199)
CHOLEST/HDLC SERPL: 4.4 {RATIO} (ref 2–5)
CO2 SERPL-SCNC: 24 MMOL/L (ref 23–29)
COMPLEXED PSA SERPL-MCNC: 0.95 NG/ML (ref 0–4)
CREAT SERPL-MCNC: 0.9 MG/DL (ref 0.5–1.4)
DIFFERENTIAL METHOD BLD: ABNORMAL
EOSINOPHIL # BLD AUTO: 0.2 K/UL (ref 0–0.5)
EOSINOPHIL NFR BLD: 3.5 % (ref 0–8)
ERYTHROCYTE [DISTWIDTH] IN BLOOD BY AUTOMATED COUNT: 13.9 % (ref 11.5–14.5)
EST. GFR  (NO RACE VARIABLE): >60 ML/MIN/1.73 M^2
ESTIMATED AVG GLUCOSE: 108 MG/DL (ref 68–131)
GLUCOSE SERPL-MCNC: 83 MG/DL (ref 70–110)
HBA1C MFR BLD: 5.4 % (ref 4–5.6)
HCT VFR BLD AUTO: 41.8 % (ref 40–54)
HDLC SERPL-MCNC: 42 MG/DL (ref 40–75)
HDLC SERPL: 23 % (ref 20–50)
HGB BLD-MCNC: 13.9 G/DL (ref 14–18)
IMM GRANULOCYTES # BLD AUTO: 0.02 K/UL (ref 0–0.04)
IMM GRANULOCYTES NFR BLD AUTO: 0.4 % (ref 0–0.5)
LDLC SERPL CALC-MCNC: 119.2 MG/DL (ref 63–159)
LYMPHOCYTES # BLD AUTO: 1.3 K/UL (ref 1–4.8)
LYMPHOCYTES NFR BLD: 27 % (ref 18–48)
MCH RBC QN AUTO: 30.6 PG (ref 27–31)
MCHC RBC AUTO-ENTMCNC: 33.3 G/DL (ref 32–36)
MCV RBC AUTO: 92 FL (ref 82–98)
MONOCYTES # BLD AUTO: 0.5 K/UL (ref 0.3–1)
MONOCYTES NFR BLD: 9.7 % (ref 4–15)
NEUTROPHILS # BLD AUTO: 2.9 K/UL (ref 1.8–7.7)
NEUTROPHILS NFR BLD: 59 % (ref 38–73)
NONHDLC SERPL-MCNC: 141 MG/DL
NRBC BLD-RTO: 0 /100 WBC
PLATELET # BLD AUTO: 319 K/UL (ref 150–450)
PMV BLD AUTO: 10.7 FL (ref 9.2–12.9)
POTASSIUM SERPL-SCNC: 4.5 MMOL/L (ref 3.5–5.1)
PROT SERPL-MCNC: 7.2 G/DL (ref 6–8.4)
RBC # BLD AUTO: 4.54 M/UL (ref 4.6–6.2)
SODIUM SERPL-SCNC: 139 MMOL/L (ref 136–145)
TRIGL SERPL-MCNC: 109 MG/DL (ref 30–150)
TSH SERPL DL<=0.005 MIU/L-ACNC: 1.05 UIU/ML (ref 0.4–4)
WBC # BLD AUTO: 4.85 K/UL (ref 3.9–12.7)

## 2024-06-08 PROCEDURE — 36415 COLL VENOUS BLD VENIPUNCTURE: CPT | Performed by: INTERNAL MEDICINE

## 2024-06-08 PROCEDURE — 83036 HEMOGLOBIN GLYCOSYLATED A1C: CPT | Performed by: INTERNAL MEDICINE

## 2024-06-08 PROCEDURE — 85025 COMPLETE CBC W/AUTO DIFF WBC: CPT | Performed by: INTERNAL MEDICINE

## 2024-06-08 PROCEDURE — 84443 ASSAY THYROID STIM HORMONE: CPT | Performed by: INTERNAL MEDICINE

## 2024-06-08 PROCEDURE — 84153 ASSAY OF PSA TOTAL: CPT | Performed by: INTERNAL MEDICINE

## 2024-06-08 PROCEDURE — 80061 LIPID PANEL: CPT | Performed by: INTERNAL MEDICINE

## 2024-06-08 PROCEDURE — 80053 COMPREHEN METABOLIC PANEL: CPT | Performed by: INTERNAL MEDICINE

## 2024-06-12 DIAGNOSIS — R93.1 ELEVATED CORONARY ARTERY CALCIUM SCORE: Primary | ICD-10-CM

## 2024-06-12 RX ORDER — ROSUVASTATIN CALCIUM 20 MG/1
20 TABLET, COATED ORAL DAILY
Qty: 30 EACH | Refills: 3 | Status: SHIPPED | OUTPATIENT
Start: 2024-06-12 | End: 2025-06-12

## 2024-07-26 ENCOUNTER — OFFICE VISIT (OUTPATIENT)
Dept: OPHTHALMOLOGY | Facility: CLINIC | Age: 49
End: 2024-07-26
Payer: COMMERCIAL

## 2024-07-26 DIAGNOSIS — Z83.511 FAMILY HISTORY OF GLAUCOMA IN FATHER: ICD-10-CM

## 2024-07-26 DIAGNOSIS — Z01.00 ENCOUNTER FOR EYE EXAM: Primary | ICD-10-CM

## 2024-07-26 DIAGNOSIS — H52.7 REFRACTIVE ERRORS: ICD-10-CM

## 2024-07-26 PROCEDURE — 99999 PR PBB SHADOW E&M-EST. PATIENT-LVL II: CPT | Mod: PBBFAC,,, | Performed by: OPTOMETRIST

## 2024-07-26 NOTE — PROGRESS NOTES
SUBJECTIVE  Rafael Banegas is 48 y.o. male  Corrected distance visual acuity was 20/30 +2 in the right eye and 20/20 in the left eye. Corrected near visual acuity was J1 in the right eye and J2 in the left eye.   Chief Complaint   Patient presents with    Annual Exam     Vision declining noticed when reading the computer, wears PAL but can't seem to get focused with them on if he find the sweet spot in one eye he can't find it in the other eye. ( Made at [employer])the patient denies flashes and floaters             HPI     Annual Exam     Additional comments: Vision declining noticed when reading the computer,   wears PAL but can't seem to get focused with them on if he find the sweet   spot in one eye he can't find it in the other eye. ( Made at [employer])the   patient denies flashes and floaters              Comments    No specialty comments available.            Last edited by Jose R Garcia on 7/26/2024  1:43 PM.         Assessment /Plan :  1. Encounter for eye exam   No pathology.     2. Family history of glaucoma in father   No evidence of glaucoma     3. Refractive errors   Dispense Final Rx for glasses.  RTC 1 year VF gOCT  Discussed above and answered questions.

## 2024-08-02 ENCOUNTER — OFFICE VISIT (OUTPATIENT)
Dept: DERMATOLOGY | Facility: CLINIC | Age: 49
End: 2024-08-02
Payer: COMMERCIAL

## 2024-08-02 DIAGNOSIS — L82.1 SEBORRHEIC KERATOSIS: ICD-10-CM

## 2024-08-02 DIAGNOSIS — Z12.83 SKIN CANCER SCREENING: Primary | ICD-10-CM

## 2024-08-02 DIAGNOSIS — D18.00 HEMANGIOMA, UNSPECIFIED SITE: ICD-10-CM

## 2024-08-02 DIAGNOSIS — L73.8 SEBACEOUS HYPERPLASIA: ICD-10-CM

## 2024-08-02 DIAGNOSIS — L57.0 ACTINIC KERATOSIS: ICD-10-CM

## 2024-08-02 DIAGNOSIS — L81.4 SOLAR LENTIGO: ICD-10-CM

## 2024-08-02 PROCEDURE — 99999 PR PBB SHADOW E&M-EST. PATIENT-LVL II: CPT | Mod: PBBFAC,,, | Performed by: STUDENT IN AN ORGANIZED HEALTH CARE EDUCATION/TRAINING PROGRAM

## 2024-08-02 RX ORDER — TAZAROTENE 1 MG/G
CREAM TOPICAL
Qty: 30 G | Refills: 11 | Status: SHIPPED | OUTPATIENT
Start: 2024-08-02

## 2024-08-02 NOTE — PATIENT INSTRUCTIONS

## 2024-08-02 NOTE — PROGRESS NOTES
Patient Information  Name: Rafael Banegas  : 1975  MRN: [id number]     Referring Physician:  Dr. Driver ref. provider found   Primary Care Physician:  Dr. Mustafa, Valdemar RIVERS MD   Date of Visit: 2024      Subjective:       Rafael Banegas is a 48 y.o. male who presents for   Chief Complaint   Patient presents with    Skin Check     Usbe, lesion one leg      HPI  Patient here for skin care.    Patient was last seen:Visit date not found     Prior notes by myself reviewed.   Clinical documentation obtained by nursing staff reviewed.    Review of Systems   Skin:  Negative for itching and rash.        Objective:    Physical Exam   Constitutional: He appears well-developed and well-nourished. No distress.   Neurological: He is alert and oriented to person, place, and time. He is not disoriented.   Psychiatric: He has a normal mood and affect.   Skin:   Areas Examined (abnormalities noted in diagram):   Head / Face Inspection Performed  Neck Inspection Performed  Chest / Axilla Inspection Performed  Abdomen Inspection Performed  Back Inspection Performed  RUE Inspected  LUE Inspection Performed                   Diagram Legend     Erythematous scaling macule/papule c/w actinic keratosis       Vascular papule c/w angioma      Pigmented verrucoid papule/plaque c/w seborrheic keratosis      Yellow umbilicated papule c/w sebaceous hyperplasia      Irregularly shaped tan macule c/w lentigo     1-2 mm smooth white papules consistent with Milia      Movable subcutaneous cyst with punctum c/w epidermal inclusion cyst      Subcutaneous movable cyst c/w pilar cyst      Firm pink to brown papule c/w dermatofibroma      Pedunculated fleshy papule(s) c/w skin tag(s)      Evenly pigmented macule c/w junctional nevus     Mildly variegated pigmented, slightly irregular-bordered macule c/w mildly atypical nevus      Flesh colored to evenly pigmented papule c/w intradermal nevus       Pink pearly papule/plaque c/w basal cell  carcinoma      Erythematous hyperkeratotic cursted plaque c/w SCC      Surgical scar with no sign of skin cancer recurrence      Open and closed comedones      Inflammatory papules and pustules      Verrucoid papule consistent consistent with wart     Erythematous eczematous patches and plaques     Dystrophic onycholytic nail with subungual debris c/w onychomycosis     Umbilicated papule    Erythematous-base heme-crusted tan verrucoid plaque consistent with inflamed seborrheic keratosis     Erythematous Silvery Scaling Plaque c/w Psoriasis     See annotation      No images are attached to the encounter or orders placed in the encounter.    [] Data reviewed  [] Independent review of test  [] Management discussed with another provider    Assessment / Plan:        Skin cancer screening  Upper body skin examination performed today including at least 6 points as noted in physical examination. No lesions suspicious for malignancy noted.    Recommend daily sun protection/avoidance and use of at least SPF 30, broad spectrum sunscreen (OTC drug).     Actinic keratosis  Cryosurgery Procedure Note    Verbal consent from the patient is obtained including, but not limited to, risk of hypopigmentation/hyperpigmentation, scar, recurrence of lesion. The patient is aware of the precancerous quality and need for treatment of these lesions. Liquid nitrogen cryosurgery is applied to the 1 actinic keratoses, as detailed in the physical exam, to produce a freeze injury. The patient is aware that blisters may form and is instructed on wound care with gentle cleansing and use of vaseline ointment to keep moist until healed. The patient is supplied a handout on cryosurgery and is instructed to call if lesions do not completely resolve.    Solar lentigo  This is a benign hyperpigmented sun induced lesion. Recommend daily sun protection/avoidance and use of at least SPF 30, broad spectrum sunscreen (OTC drug) will reduce the number of new  lesions. Treatment of these benign lesions are considered cosmetic.    Hemangioma, unspecified site  This is a benign vascular lesion. Reassurance given. No treatment required.     Sebaceous hyperplasia  -     refilled tazarotene (AVAGE) 0.1 % cream; Apply a pea size amount to entire face three nights per week.  Dispense: 30 g; Refill: 11    Seborrheic keratosis  These are benign inherited growths without a malignant potential. Reassurance given to patient. No treatment is necessary.              LOS NUMBER AND COMPLEXITY OF PROBLEMS    COMPLEXITY OF DATA RISK TOTAL TIME (m)   33680  26322 [] 1 self-limited or minor problem [x] Minimal to none [] No treatment recommended or patient to monitor 15-29  10-19   18155  20825 Low  [] 2 or > self limited or minor problems  [] 1 stable chronic illness  [] 1 acute, uncomplicated illness or injury Limited (2)  [] Prior external notes from each unique source  [] Review result of each unique test  [] Order each unique test []  Low  OTC medications, minor skin biopsy 30-44  20-29   03768  01032 Moderate  []  1 or > chronic illness with progression, exacerbation or SE of treatment  [x]  2 or more stable chronic illnesses  []  1 acute illness with systemic symptoms  []  1 acute complicated injury  []  1 undiagnosed new problem with uncertain prognosis Moderate (1/3 below)  []  3 or more data items        *Now includes assessment requiring independent historian  []  Independent interpretation of a test  []  Discuss management/test with another provider Moderate  [x]  Prescription drug mgmt  []  Minor surgery with risk discussed  []  Mgmt limited by social determinates 45-59  30-39   75090  52365 High  []  1 or more chronic illness with severe exacerbation, progression or SE of treatment  []  1 acute or chronic illness/injury that poses a threat to life or bodily function Extensive (2/3 below)  []  3 or more data items        *Now includes assessment requiring independent  historian.  []  Independent interpretation of a test  []  Discuss management/test with another provider High  []  Major surgery with risk discussed  []  Drug therapy requiring intensive monitoring for toxicity  []  Hospitalization  []  Decision for DNR 60-74  40-54      No follow-ups on file.    Sandra Arreaga MD, FAAD  [facility]

## 2024-09-09 PROBLEM — Z00.00 ROUTINE GENERAL MEDICAL EXAMINATION AT A HEALTH CARE FACILITY: Status: RESOLVED | Noted: 2023-03-27 | Resolved: 2024-09-09

## 2024-11-07 DIAGNOSIS — I10 ESSENTIAL HYPERTENSION: ICD-10-CM

## 2024-11-07 RX ORDER — LISINOPRIL 10 MG/1
TABLET ORAL
Qty: 90 TABLET | Refills: 1 | Status: SHIPPED | OUTPATIENT
Start: 2024-11-07

## 2024-11-07 NOTE — TELEPHONE ENCOUNTER
No care due was identified.  [facility] Embedded Care Due Messages. Reference number: [id number].   11/07/2024 2:08:44 PM CST

## 2024-11-07 NOTE — TELEPHONE ENCOUNTER
Refill Decision Note   Rafael Bassam  is requesting a refill authorization.    Brief Assessment and Rationale for Refill:   Approve       Medication Therapy Plan:         Comments:     Note composed:5:03 PM 11/07/2024

## 2025-01-24 DIAGNOSIS — R93.1 ELEVATED CORONARY ARTERY CALCIUM SCORE: ICD-10-CM

## 2025-01-24 RX ORDER — ROSUVASTATIN CALCIUM 20 MG/1
20 TABLET, COATED ORAL DAILY
Qty: 30 TABLET | Refills: 1 | Status: SHIPPED | OUTPATIENT
Start: 2025-01-24 | End: 2025-03-25

## 2025-03-31 DIAGNOSIS — R93.1 ELEVATED CORONARY ARTERY CALCIUM SCORE: ICD-10-CM

## 2025-03-31 RX ORDER — ROSUVASTATIN CALCIUM 20 MG/1
TABLET, COATED ORAL
Qty: 90 TABLET | Refills: 0 | Status: SHIPPED | OUTPATIENT
Start: 2025-03-31

## 2025-03-31 NOTE — TELEPHONE ENCOUNTER
Care Due:                  Date            Visit Type   Department     Provider  --------------------------------------------------------------------------------                                MYCHART                              ANNUAL                              CHECKUP/PHY  [facility] INTER[facility]  Last Visit: 06-      S            MEDICINE       Valdemar Mustafa                              EP -                              PRIMARY      [facility]  Next Visit: 06-      CARE ([facility])   MEDICINE       Valdemar Mustafa                                                            Last  Test          Frequency    Reason                     Performed    Due Date  --------------------------------------------------------------------------------    CMP.........  12 months..  lisinopriL, rosuvastatin.  06- 06-    Lipid Panel.  12 months..  rosuvastatin.............  06- 06-    [employer] Embedded Care Due Messages. Reference number: [id number].   3/31/2025 5:52:01 PM CDT

## 2025-03-31 NOTE — TELEPHONE ENCOUNTER
Provider Staff:  Action required for this patient    Requires labs      Please see care gap opportunities below in Care Due Message.    Thanks!  [facility]     Appointments      Date Provider   Last Visit   6/6/2024 Valdemar Mustafa MD   Next Visit   6/6/2025 Valdemar Mustafa MD     Refill Decision Note   Rafael Banegas  is requesting a refill authorization.  Brief Assessment and Rationale for Refill:  Approve     Medication Therapy Plan:         Comments:     Note composed:6:07 PM 03/31/2025

## 2025-04-04 ENCOUNTER — OFFICE VISIT (OUTPATIENT)
Dept: FAMILY MEDICINE | Facility: CLINIC | Age: 50
End: 2025-04-04
Payer: COMMERCIAL

## 2025-04-04 ENCOUNTER — PATIENT MESSAGE (OUTPATIENT)
Dept: INTERNAL MEDICINE | Facility: CLINIC | Age: 50
End: 2025-04-04
Payer: COMMERCIAL

## 2025-04-04 VITALS
HEIGHT: 73 IN | WEIGHT: 209.31 LBS | HEART RATE: 100 BPM | DIASTOLIC BLOOD PRESSURE: 88 MMHG | TEMPERATURE: 97 F | SYSTOLIC BLOOD PRESSURE: 160 MMHG | BODY MASS INDEX: 27.74 KG/M2 | OXYGEN SATURATION: 97 %

## 2025-04-04 DIAGNOSIS — I10 PRIMARY HYPERTENSION: ICD-10-CM

## 2025-04-04 DIAGNOSIS — M54.32 LEFT SIDED SCIATICA: Primary | ICD-10-CM

## 2025-04-04 PROCEDURE — 99999 PR PBB SHADOW E&M-EST. PATIENT-LVL III: CPT | Mod: PBBFAC,,, | Performed by: INTERNAL MEDICINE

## 2025-04-04 RX ORDER — TIZANIDINE 4 MG/1
4 TABLET ORAL EVERY 6 HOURS PRN
Qty: 30 TABLET | Refills: 0 | Status: SHIPPED | OUTPATIENT
Start: 2025-04-04 | End: 2025-04-14

## 2025-04-04 NOTE — PROGRESS NOTES
Patient ID: Rafael Banegas is a 49 y.o. male.    Chief Complaint: Sciatic pain      History of Present Illness    - Mr. Banegas presents with sciatic nerve pain and associated symptoms that began yesterday morning.    Mr. Banegas reports sciatic nerve pain that began yesterday at approximately 5 AM. The pain is described as intense pressure, most severe in the lower back area. He notes that this pain is similar to what he had 9 years ago before back surgery for two herniated discs (L4-L5 and L5-S1).    The pain radiates down the left leg, causing tightness in the hamstring and calf muscles. He reports a significant loss of strength in the left calf, stating difficulty lifting his heel off the ground, which he could easily do 2 days ago. He has discomfort in all positions, unable to find relief whether standing, sitting, or lying down. This differs from his previous episode where certain positions provided some relief.    He has not had this type of pain in 9 years, since his back surgery. The symptoms are causing significant discomfort, prompting him to seek medical attention on a Friday to avoid enduring pain over the weekend.    His blood pressure is elevated today, which is unusual as he is on lisinopril and typically has well-controlled blood pressure around 120/80.      ROS:  General: denies fever, denies chills, denies fatigue, denies weight gain, denies weight loss  Eyes: denies vision changes, denies redness, denies discharge  ENT: denies ear pain, denies nasal congestion, denies sore throat  Cardiovascular: denies chest pain, denies palpitations, denies lower extremity edema  Respiratory: denies cough, denies shortness of breath  Gastrointestinal: denies abdominal pain, denies nausea, denies vomiting, denies diarrhea, denies constipation, denies blood in stool  Genitourinary: denies dysuria, denies hematuria, denies frequency  Musculoskeletal: denies joint pain, denies muscle pain, complains of pain with  "movement, complains of muscle tension, complains of muscle weakness  Skin: denies rash, denies lesion  Neurological: denies headache, denies dizziness, denies numbness, denies tingling, complains of nerve pain  Psychiatric: denies anxiety, denies depression, denies sleep difficulty            Physical Exam    General: In no acute distress.  Head: Normocephalic. Non traumatic.  Eyes: PERRLA. EOMs full. Conjunctivae clear. Fundi grossly normal.  Ears: EACs clear. TMs normal.  Nose: Mucosa pink. Mucosa moist. No obstruction.  Throat: Clear. No exudates. No lesions.  Neck: Supple. No masses. No thyromegaly. No bruits.  Chest: Lungs clear. No rales. No rhonchi. No wheezes.  Heart: RRR. No murmurs. No rubs. No gallops.  Abdomen: Soft. No tenderness. No masses. BS normal.  : Normal external genitalia. No lesions. No discharge. No hernias  noted.  Back: Normal curvature. No scoliosis. No tenderness.  Extremities: Warm. Well perfused. No upper extremity edema. No lower extremity edema. FROM. No deformities. No joint erythema.  Neuro: No focal deficits appreciated. Good muscle tone. Normal response to visual stimuli. Normal response to auditory stimuli. Strength 5/5 bilateral calves  Skin: Normal. No rashes. No lesions noted.  Musculoskeletal: Full strength in both legs.  Vitals: ELEVATED BLOOD PRESSURE.          Current Medications[1]            VITAL SIGNS:  Vitals:    04/04/25 1444   BP: (!) 160/88   Pulse: 100   Temp: 96.9 °F (36.1 °C)   TempSrc: Temporal   SpO2: 97%   Weight: 95 kg (209 lb 5.2 oz)   Height: 6' 0.99" (1.854 m)       CURRENT BMI:   Body mass index is 27.62 kg/m².    LABS REVIEWED:    CBC:  Lab Results   Component Value Date    WBC 4.85 06/08/2024    RBC 4.54 (L) 06/08/2024    HGB 13.9 (L) 06/08/2024    HCT 41.8 06/08/2024    MCV 92 06/08/2024    MCH 30.6 06/08/2024    MCHC 33.3 06/08/2024    RDW 13.9 06/08/2024     06/08/2024    MPV 10.7 06/08/2024    GRAN 2.9 06/08/2024    GRAN 59.0 06/08/2024    " LYMPH 1.3 06/08/2024    LYMPH 27.0 06/08/2024    MONO 0.5 06/08/2024    MONO 9.7 06/08/2024    EOS 0.2 06/08/2024    BASO 0.02 06/08/2024    EOSINOPHIL 3.5 06/08/2024    BASOPHIL 0.4 06/08/2024       CHEMISTRY:  Sodium   Date Value Ref Range Status   06/08/2024 139 136 - 145 mmol/L Final     Potassium   Date Value Ref Range Status   06/08/2024 4.5 3.5 - 5.1 mmol/L Final     Chloride   Date Value Ref Range Status   06/08/2024 107 95 - 110 mmol/L Final     CO2   Date Value Ref Range Status   06/08/2024 24 23 - 29 mmol/L Final     Glucose   Date Value Ref Range Status   06/08/2024 83 70 - 110 mg/dL Final     BUN   Date Value Ref Range Status   06/08/2024 13 6 - 20 mg/dL Final     Creatinine   Date Value Ref Range Status   06/08/2024 0.9 0.5 - 1.4 mg/dL Final     Calcium   Date Value Ref Range Status   06/08/2024 9.5 8.7 - 10.5 mg/dL Final     Total Protein   Date Value Ref Range Status   06/08/2024 7.2 6.0 - 8.4 g/dL Final     Albumin   Date Value Ref Range Status   06/08/2024 4.0 3.5 - 5.2 g/dL Final     Total Bilirubin   Date Value Ref Range Status   06/08/2024 0.6 0.1 - 1.0 mg/dL Final     Comment:     For infants and newborns, interpretation of results should be based  on gestational age, weight and in agreement with clinical  observations.    Premature Infant recommended reference ranges:  Up to 24 hours.............<8.0 mg/dL  Up to 48 hours............<12.0 mg/dL  3-5 days..................<15.0 mg/dL  6-29 days.................<15.0 mg/dL       Alkaline Phosphatase   Date Value Ref Range Status   06/08/2024 49 (L) 55 - 135 U/L Final     AST   Date Value Ref Range Status   06/08/2024 30 10 - 40 U/L Final     ALT   Date Value Ref Range Status   06/08/2024 20 10 - 44 U/L Final     Anion Gap   Date Value Ref Range Status   06/08/2024 8 8 - 16 mmol/L Final     eGFR   Date Value Ref Range Status   06/08/2024 >60.0 >60 mL/min/1.73 m^2 Final       LIPID PANEL:  Lab Results   Component Value Date    CHOL 183 06/08/2024  "   CHOL 213 (H) 06/20/2023     Lab Results   Component Value Date    TRIG 109 06/08/2024    TRIG 138 06/20/2023     Lab Results   Component Value Date    HDL 42 06/08/2024    HDL 45 06/20/2023     Lab Results   Component Value Date    LDLCALC 119.2 06/08/2024    LDLCALC 140.4 06/20/2023       THYROID:  Lab Results   Component Value Date    TSH 1.050 06/08/2024       DIABETES:  Lab Results   Component Value Date    HGBA1C 5.4 06/08/2024     No results found for: "MICALBCREAT"    Assessment and Plan     Assessment & Plan    SCIATICA, LEFT SIDE:  - Evaluated the patient's sciatic nerve pain on the left side, which is similar to symptoms experienced 9 years ago before back surgery.  - Noted that the pain woke the patient at 5 AM the previous day, with pressure in the left leg, tightened hamstring and calf, and inability to lift heel off the ground.  - Explained the mechanism of sciatica: enlargement of gluteal muscles can compress the sciatic nerve, causing pain down the leg.  - Prescribed a muscle relaxer as initial treatment for suspected muscle-related sciatic nerve compression.  - Prescribed a muscle relaxer for treatment.  - Evaluated the patient's reported sudden weakness in the left calf, with inability to lift heel off the ground.  - Focused treatment on addressing the underlying sciatica rather than specific interventions for muscle weakness.  - Performed strength tests on both legs, revealing full strength despite pain, indicating weakness is more pain-related than actual muscle weakness.    ESSENTIAL HYPERTENSION:  - Noted elevated blood pressure during the visit, likely attributed to pain.  - Assessed that the current elevated blood pressure is not a concern given the patient's history of well-controlled hypertension with typical readings around 120 systolic on lisinopril.  - Recommend continuation of current hypertension management with lisinopril.      1. Left sided sciatica  Comments:  Strength in calf " 5/5 bilateral. Will prescribe Zanaflex. Recommend seeing neurosurgery if pain does not improve  Orders:  -     tiZANidine (ZANAFLEX) 4 MG tablet; Take 1 tablet (4 mg total) by mouth every 6 (six) hours as needed (muscle spasm).  Dispense: 30 tablet; Refill: 0    2. Primary hypertension  Comments:  Elevated today, likely due to pain. Will continue to monitor  Overview:  Controlled.  Continue current medications             No follow-ups on file.  31 of total time spent on the encounter, which includes face to face time and non-face to face time preparing to see the patient. This includes obtaining and/or reviewing separately obtained history, performing a medically appropriate examination and/or evaluation, and counseling and educating the patient/family/caregiver. Includes documenting clinical information in the electronic or other health record, independently interpreting results (not separately reported) and communicating results to the patient/family/caregiver, with care coordination (not separately reported). Medications, tests and/or procedures ordered as necessary along with referring and communicating with other health professionals (when not separately reported).      This note was generated with the assistance of ambient listening technology. Verbal consent was obtained by the patient and accompanying visitor(s) for the recording of patient appointment to facilitate this note. I attest to having reviewed and edited the generated note for accuracy, though some syntax or spelling errors may persist. Please contact the author of this note for any clarification.            [1]   Current Outpatient Medications:     fluticasone propionate (FLONASE) 50 mcg/actuation nasal spray, 2 sprays (100 mcg total) by Each Nostril route nightly., Disp: 48 g, Rfl: 0    lisinopriL 10 MG tablet, TAKE ONE TABLET BY MOUTH ONCE PER DAY, Disp: 90 tablet, Rfl: 1    rosuvastatin (CRESTOR) 20 MG tablet, TAKE ONE TABLET BY MOUTH ONCE  PER DAY, Disp: 90 tablet, Rfl: 0    tazarotene (AVAGE) 0.1 % cream, Apply a pea size amount to entire face three nights per week., Disp: 30 g, Rfl: 11    tiZANidine (ZANAFLEX) 4 MG tablet, Take 1 tablet (4 mg total) by mouth every 6 (six) hours as needed (muscle spasm)., Disp: 30 tablet, Rfl: 0

## 2025-04-07 ENCOUNTER — PATIENT MESSAGE (OUTPATIENT)
Dept: FAMILY MEDICINE | Facility: CLINIC | Age: 50
End: 2025-04-07
Payer: COMMERCIAL

## 2025-04-07 DIAGNOSIS — M54.32 LEFT SIDED SCIATICA: Primary | ICD-10-CM

## 2025-04-07 NOTE — TELEPHONE ENCOUNTER
"Pt sent portal msg. Please advise.     "Good morning. The sciatic nerve leg pain has become worse. Ive been taking the muscle relaxer but it does not seem to help. Ive been unable to sleep due to the pain. What could my next steps be?"  "

## 2025-04-08 ENCOUNTER — OFFICE VISIT (OUTPATIENT)
Dept: INTERNAL MEDICINE | Facility: CLINIC | Age: 50
End: 2025-04-08
Payer: COMMERCIAL

## 2025-04-08 ENCOUNTER — HOSPITAL ENCOUNTER (OUTPATIENT)
Dept: RADIOLOGY | Facility: HOSPITAL | Age: 50
Discharge: HOME | End: 2025-04-08
Attending: INTERNAL MEDICINE
Payer: COMMERCIAL

## 2025-04-08 VITALS
OXYGEN SATURATION: 98 % | WEIGHT: 210.56 LBS | TEMPERATURE: 98 F | HEIGHT: 73 IN | BODY MASS INDEX: 27.91 KG/M2 | HEART RATE: 84 BPM | SYSTOLIC BLOOD PRESSURE: 120 MMHG | DIASTOLIC BLOOD PRESSURE: 82 MMHG

## 2025-04-08 DIAGNOSIS — M54.42 ACUTE LEFT-SIDED LOW BACK PAIN WITH LEFT-SIDED SCIATICA: Primary | ICD-10-CM

## 2025-04-08 DIAGNOSIS — M54.42 ACUTE LEFT-SIDED LOW BACK PAIN WITH LEFT-SIDED SCIATICA: ICD-10-CM

## 2025-04-08 DIAGNOSIS — R50.9 FEVER, UNSPECIFIED FEVER CAUSE: ICD-10-CM

## 2025-04-08 DIAGNOSIS — Z86.711 HISTORY OF PULMONARY EMBOLISM: ICD-10-CM

## 2025-04-08 DIAGNOSIS — R93.1 ELEVATED CORONARY ARTERY CALCIUM SCORE: ICD-10-CM

## 2025-04-08 DIAGNOSIS — I10 PRIMARY HYPERTENSION: ICD-10-CM

## 2025-04-08 LAB
CTP QC/QA: YES
POC MOLECULAR INFLUENZA A AGN: NEGATIVE
POC MOLECULAR INFLUENZA B AGN: NEGATIVE

## 2025-04-08 PROCEDURE — 72100 X-RAY EXAM L-S SPINE 2/3 VWS: CPT | Mod: 26,,, | Performed by: STUDENT IN AN ORGANIZED HEALTH CARE EDUCATION/TRAINING PROGRAM

## 2025-04-08 PROCEDURE — 3074F SYST BP LT 130 MM HG: CPT | Mod: CPTII,S$GLB,, | Performed by: INTERNAL MEDICINE

## 2025-04-08 PROCEDURE — 4010F ACE/ARB THERAPY RXD/TAKEN: CPT | Mod: CPTII,S$GLB,, | Performed by: INTERNAL MEDICINE

## 2025-04-08 PROCEDURE — 72100 X-RAY EXAM L-S SPINE 2/3 VWS: CPT | Mod: TC

## 2025-04-08 PROCEDURE — 87502 INFLUENZA DNA AMP PROBE: CPT | Mod: QW,S$GLB,, | Performed by: INTERNAL MEDICINE

## 2025-04-08 PROCEDURE — G2211 COMPLEX E/M VISIT ADD ON: HCPCS | Mod: S$GLB,,, | Performed by: INTERNAL MEDICINE

## 2025-04-08 PROCEDURE — 99999 PR PBB SHADOW E&M-EST. PATIENT-LVL IV: CPT | Mod: PBBFAC,,, | Performed by: INTERNAL MEDICINE

## 2025-04-08 PROCEDURE — 1159F MED LIST DOCD IN RCRD: CPT | Mod: CPTII,S$GLB,, | Performed by: INTERNAL MEDICINE

## 2025-04-08 PROCEDURE — 3079F DIAST BP 80-89 MM HG: CPT | Mod: CPTII,S$GLB,, | Performed by: INTERNAL MEDICINE

## 2025-04-08 PROCEDURE — 3008F BODY MASS INDEX DOCD: CPT | Mod: CPTII,S$GLB,, | Performed by: INTERNAL MEDICINE

## 2025-04-08 PROCEDURE — 99214 OFFICE O/P EST MOD 30 MIN: CPT | Mod: S$GLB,,, | Performed by: INTERNAL MEDICINE

## 2025-04-08 RX ORDER — METHYLPREDNISOLONE 4 MG/1
TABLET ORAL
Qty: 1 EACH | Refills: 0 | Status: SHIPPED | OUTPATIENT
Start: 2025-04-08

## 2025-04-08 RX ORDER — GABAPENTIN 100 MG/1
CAPSULE ORAL
Qty: 90 CAPSULE | Refills: 0 | Status: SHIPPED | OUTPATIENT
Start: 2025-04-08

## 2025-04-08 RX ORDER — FEXOFENADINE HCL 60 MG/1
60 TABLET, FILM COATED ORAL DAILY
COMMUNITY

## 2025-04-08 NOTE — PROGRESS NOTES
Subjective:      Patient ID: Rafael Banegas is a 49 y.o. male.    Chief Complaint: Back Pain    Back Pain  This is a new problem. Episode onset: 5 days. The problem occurs constantly. The problem has been gradually worsening since onset. The pain is present in the gluteal. The quality of the pain is described as aching, burning, cramping, shooting and stabbing. The pain radiates to the left foot and left thigh. The pain is at a severity of 8/10. The pain is severe. The pain is Worse during the night. The symptoms are aggravated by bending, position, lying down and standing. Stiffness is present In the morning. Associated symptoms include a fever, leg pain and weakness. Pertinent negatives include no abdominal pain, bladder incontinence, bowel incontinence, chest pain, dysuria, headaches, numbness, paresis, paresthesias, pelvic pain, perianal numbness, tingling or weight loss. Treatments tried: tizanidine from . The treatment provided mild relief.     History of Present Illness                 50 yo with Problem List[1]  Past Medical History:   Diagnosis Date    Cervical radiculitis 3/29/2022    Elevated coronary artery calcium score 12/23/2022    Hypertension      Reports temp of 100.5 or 100.6. and body aches yesterday  Last dose of antipyretic was over 12 hours ago.  He feels body aches and fever now resolved.   Admits to coughing yesterday that he attributes to PND. He has longstanding allergies.   Review of Systems   Constitutional:  Positive for fever. Negative for chills, fatigue and weight loss.   HENT:  Negative for sinus pain.    Eyes:  Negative for visual disturbance.   Respiratory:  Negative for shortness of breath and wheezing.    Cardiovascular:  Negative for chest pain, palpitations and leg swelling.   Gastrointestinal:  Negative for abdominal pain, bowel incontinence, diarrhea, nausea and vomiting.   Genitourinary:  Negative for bladder incontinence, dysuria, flank pain, frequency, hematuria and  "pelvic pain.   Musculoskeletal:  Positive for back pain. Negative for gait problem, neck pain and neck stiffness.   Skin:  Negative for rash and wound.   Neurological:  Positive for weakness. Negative for tingling, numbness, headaches and paresthesias.     Objective:   /82 (BP Location: Right arm, Patient Position: Sitting)   Pulse 84   Temp 98.1 °F (36.7 °C)   Ht 6' 0.99" (1.854 m)   Wt 95.5 kg (210 lb 8.6 oz)   SpO2 98%   BMI 27.78 kg/m²     Physical Exam  Constitutional:       General: He is not in acute distress.     Appearance: He is well-developed.   HENT:      Head: Normocephalic and atraumatic.   Eyes:      Extraocular Movements: Extraocular movements intact.   Neck:      Thyroid: No thyromegaly.   Cardiovascular:      Rate and Rhythm: Normal rate and regular rhythm.   Pulmonary:      Breath sounds: Normal breath sounds. No wheezing or rales.   Abdominal:      General: Bowel sounds are normal.      Palpations: Abdomen is soft.      Tenderness: There is no abdominal tenderness.   Musculoskeletal:         General: No swelling.      Cervical back: Neck supple. No rigidity.      Lumbar back: No swelling, edema, tenderness or bony tenderness. Normal range of motion. Positive left straight leg raise test. Negative right straight leg raise test.   Lymphadenopathy:      Cervical: No cervical adenopathy.   Skin:     General: Skin is warm and dry.   Neurological:      Mental Status: He is alert and oriented to person, place, and time.   Psychiatric:         Behavior: Behavior normal.         Lab Results   Component Value Date    WBC 9.13 04/08/2025    HGB 14.5 04/08/2025    HGB 13.9 (L) 06/08/2024    HGB 14.8 03/16/2023    HCT 43.9 04/08/2025    MCV 92 04/08/2025    MCV 92 06/08/2024    MCV 94 03/16/2023     04/08/2025    CHOL 183 06/08/2024    TRIG 109 06/08/2024    HDL 42 06/08/2024    LDLCALC 119.2 06/08/2024    LDLCALC 140.4 06/20/2023    LDLCALC 195.4 (H) 03/16/2023    ALT 20 06/08/2024    AST " 30 06/08/2024     06/08/2024    K 4.5 06/08/2024    CALCIUM 9.5 06/08/2024     06/08/2024    CO2 24 06/08/2024    BUN 13 06/08/2024    CREATININE 0.9 06/08/2024    CREATININE 0.8 03/16/2023    CREATININE 0.8 03/28/2022    EGFRNORACEVR >60.0 06/08/2024    EGFRNORACEVR >60.0 03/16/2023    TSH 1.050 06/08/2024    TSH 1.205 03/16/2023    TSH 1.695 03/28/2022    PSA 0.95 06/08/2024    PSA 1.0 03/16/2023    PSA 1.2 03/28/2022    GLU 83 06/08/2024    HGBA1C 5.4 06/08/2024    HGBA1C 5.6 03/28/2022          The 10-year ASCVD risk score (Bev DK, et al., 2019) is: 7.9%    Values used to calculate the score:      Age: 49 years      Sex: Male      Is Non- : No      Diabetic: No      Tobacco smoker: Yes      Systolic Blood Pressure: 120 mmHg      Is BP treated: Yes      HDL Cholesterol: 42 mg/dL      Total Cholesterol: 183 mg/dL     Assessment:     1. Acute left-sided low back pain with left-sided sciatica    2. Fever, unspecified fever cause    3. Primary hypertension    4. History of pulmonary embolism    5. Elevated coronary artery calcium score      Plan:   1. Acute left-sided low back pain with left-sided sciatica  -     gabapentin (NEURONTIN) 100 MG capsule; 1 to 3 tabs po qhs for nerve pain.  Dispense: 90 capsule; Refill: 0  -     methylPREDNISolone (MEDROL, AFSHIN,) 4 mg tablet; use as directed  Dispense: 1 each; Refill: 0  -     X-Ray Lumbar Spine Ap And Lateral; Future; Expected date: 04/08/2025    2. Fever, unspecified fever cause  -     Urinalysis, Reflex to Urine Culture; Future; Expected date: 04/08/2025  -     CBC Auto Differential; Future; Expected date: 04/08/2025  -     POCT Influenza A/B Molecular  -     X-Ray Lumbar Spine Ap And Lateral; Future; Expected date: 04/08/2025    3. Primary hypertension  Overview:  Controlled.  Continue current medications      4. History of pulmonary embolism  Overview:  Surrounding laminectomy 2016. Reports neg genetic testing. Pulmonologist  advised ok to stop anticoag.       5. Elevated coronary artery calcium score  Overview:  66 2022, statin initiated 3/2023          Patient Instructions   Tylenol 500 to 1000 mg every 8 hours as needed for pain.      Future Appointments   Date Time Provider Department Center   4/23/2025  4:30 PM Ally Sutton, PT [facility] [place]   4/25/2025 12:30 PM Ally Sutton, PT [facility] [place]   4/28/2025  3:30 PM Ally Sutton, PT [facility] [place]   5/2/2025  3:30 PM Pili Hampton, PT [facility] [place]   6/6/2025  3:20 PM Valdemar Mustafa MD [facility] [place]       Lab Frequency Next Occurrence   Ambulatory Referral/Consult to Physical Therapy Once 04/14/2025       Follow up if symptoms worsen or fail to improve.           Visit today included increased complexity  addressed and managing the longitudinal care of the patient due to the serious and/or complex managed problem(s)          [1]   Patient Active Problem List  Diagnosis    Primary hypertension    Environmental allergies    History of pulmonary embolism    History of laminectomy    Encounter for colorectal cancer screening    Elevated coronary artery calcium score    Lumbar radicular pain    Decreased strength, endurance, and mobility    Impaired mobility and ADLs

## 2025-04-09 ENCOUNTER — RESULTS FOLLOW-UP (OUTPATIENT)
Dept: INTERNAL MEDICINE | Facility: CLINIC | Age: 50
End: 2025-04-09

## 2025-04-14 ENCOUNTER — CLINICAL SUPPORT (OUTPATIENT)
Dept: REHABILITATION | Facility: HOSPITAL | Age: 50
End: 2025-04-14
Payer: COMMERCIAL

## 2025-04-14 DIAGNOSIS — Z74.09 DECREASED STRENGTH, ENDURANCE, AND MOBILITY: ICD-10-CM

## 2025-04-14 DIAGNOSIS — Z78.9 IMPAIRED MOBILITY AND ADLS: ICD-10-CM

## 2025-04-14 DIAGNOSIS — Z74.09 IMPAIRED MOBILITY AND ADLS: ICD-10-CM

## 2025-04-14 DIAGNOSIS — M54.16 LUMBAR RADICULAR PAIN: Primary | ICD-10-CM

## 2025-04-14 DIAGNOSIS — M54.32 LEFT SIDED SCIATICA: ICD-10-CM

## 2025-04-14 DIAGNOSIS — R53.1 DECREASED STRENGTH, ENDURANCE, AND MOBILITY: ICD-10-CM

## 2025-04-14 DIAGNOSIS — R68.89 DECREASED STRENGTH, ENDURANCE, AND MOBILITY: ICD-10-CM

## 2025-04-14 PROBLEM — M25.60 DECREASED RANGE OF MOTION WITH DECREASED STRENGTH: Status: RESOLVED | Noted: 2022-03-29 | Resolved: 2025-04-14

## 2025-04-14 PROBLEM — M54.12 CERVICAL RADICULITIS: Status: RESOLVED | Noted: 2022-03-29 | Resolved: 2025-04-14

## 2025-04-14 PROCEDURE — 97162 PT EVAL MOD COMPLEX 30 MIN: CPT

## 2025-04-14 PROCEDURE — 97140 MANUAL THERAPY 1/> REGIONS: CPT

## 2025-04-14 PROCEDURE — 97112 NEUROMUSCULAR REEDUCATION: CPT

## 2025-04-14 NOTE — PROGRESS NOTES
Outpatient Rehab    Physical Therapy Evaluation    Patient Name: Christo Banegas  MRN: [id number]  YOB: 1975  Encounter Date: 4/14/2025    Therapy Diagnosis:   Encounter Diagnoses   Name Primary?    Left sided sciatica     Lumbar radicular pain Yes    Decreased strength, endurance, and mobility     Impaired mobility and ADLs      Physician: Jelly Chaney DO    Physician Orders: Eval and Treat  Medical Diagnosis: Left sided sciatica    Visit # / Visits Authorized:  1 / 1  Insurance Authorization Period: 4/7/2025 to 12/31/2025  Date of Evaluation: 4/14/2025  Plan of Care Certification: 4/14/2025 to 6/11/2025     Time In: 1535   Time Out: 1644  Total Time: 69   Total Billable Time: 69    Intake Outcome Measure for FOTO Survey    Therapist reviewed FOTO scores for Christo Banegas on 4/14/2025.   FOTO report - see Media section or FOTO account episode details.     Intake Score: 57%         Subjective   History of Present Illness  Christo is a 49 y.o. male who reports to physical therapy with a chief concern of left leg pain and onset of weakness.     The patient reports a medical diagnosis of Left sided sciatica (M54.32).    Diagnostic tests related to this condition: X-ray.        History of Present Condition/Illness: Patient reports that he had lumbar laminectomy 9 years ago. Patient reports that he has bouts of pain here and there. Most of the pain tends to be on the left side when present. Patient reports that he took a trip to [facility] years ago and experienced numbness in his right buttock/pubic region. Does indicate some bladder incontinence with this. Reports that he never went to the doctor for this and he reports that it got better for the most part but is still present. Patient has never felt pain and weakness as he is experiencing now. Patient reports that he woke up a week ago with a Charley horse feeling in his left leg and began having less strength in the left leg and less stability as well. Patient  denies a mechanism of injury. Patient reports that he was provided gabapentin and muscle relaxer and with this he is now able to sleep but was unable to sleep prior. Patient reports that sitting in a slumped position is he most comfortable position right now. Patient does regularly participated in  guided exercise at the gym with a program provided to him. He has not been able to do this since the onset of his symptoms and that is affecting him mentally as he does not want to lose the progress he has made.     Activities of Daily Living  Social history was obtained from Patient.    General Prior Level of Function Comments: Independent and pain free with all ADL, IADL, community mobility and functional activities.  General Current Level of Function Comments: Independent with all ADL, IADL, community mobility and functional activities with reports of increased pain and need for increased time and frequent breaks.       Previously independent with activities of daily living? Yes     Currently independent with activities of daily living? Yes          Previously independent with instrumental activities of daily living? Yes     Currently independent with instrumental activities of daily living? Yes              Pain     Patient reports a current pain level of 1/10. Pain at best is reported as 1/10. Pain at worst is reported as 8/10.   Location: left leg and buttocks region  Clinical Progression (since onset): Stable  Pain Qualities: Burning, Needle-like  Pain-Relieving Factors: Sitting  Pain-Aggravating Factors: Standing, Bending         Review of Systems  Patient reports: Bladder Incontinence and Saddle Numbness  Patient denies: Bowel Incontinence        Living Arrangements  Living Situation  Housing: Home independently  Living Arrangements: Alone    Home Setup  Type of Structure: House  Home Access: Level entry        Employment  Patient reports: Does the patient's condition impact their ability to  work?  Employment Status: Employed full-time   Patient is an , mainly doing desk work. Patient has to be able to walk up and down steps all day long.       Past Medical History/Physical Systems Review:   Rafael Banegas  has a past medical history of Cervical radiculitis, Elevated coronary artery calcium score, and Hypertension.    Rafael Banegas  has a past surgical history that includes Lumbar laminectomy (2017) and Colonoscopy (N/A, 06/24/2022).    Rafael has a current medication list which includes the following prescription(s): fexofenadine, fluticasone propionate, gabapentin, lisinopril, methylprednisolone, rosuvastatin, and tazarotene.    Review of patient's allergies indicates:  No Known Allergies     Objective          RANGE OF MOTION:   Lumbar ROM Right  (spine) Left   Pain/Dysfunction with Movement Goal   Lumbar Flexion (60º) 30 --- Pain  60   Lumbar Extension (30º) 10 --- Pain left side 25   Lumbar Side Bending (25º) 17 5 Pain left side 25 B   Lumbar Rotation 50% 25% Pain on left side  75% B       Hip AROM/PROM Right Left Pain/Dysfunction with Movement Goal   Hip Flexion (120º) 120 110 Pain left side  120   Hip Extension (30º) 10 4 Pain left side  25 B   Hip Abduction (45º) 25 30 Pain left side 40 B   Hip IR (45º) 25 20 Pain left side 35 B   Hip ER (45º) 45 40 Pain left side 45 B       STRENGTH: (* denotes pain)  L/E MMT Right  (spine) Left Dysfunction with Movement Goal   Modified (90/90) Abdominal Strength  75 degrees --- Pain low back 25 degrees   Hip Flexion  4/5 4-/5 Pain left 4+/5 B   Hip Extension  3+/5 2+/5 Pain left, lacks control 4+/5 B   Hip Abduction  3+/5 2+/5  4+/5 B   Knee Extension 4/5 4-/5  5/5 B   Knee Flexion 4/5 3+/5 Pain left, lacks control 5/5 B   Hip IR 4-/5 3+/5  4+/5 B   Hip ER 4-/5 3+/5  4+/5 B   Ankle DF 4/5 4-/5  5/5 B   Ankle PF 4/5 3-/5 Lacks control  5/5 B     MUSCLE LENGTH:   Muscle Tested  Right Left  Limitation Goal   Hip Flexors [x] Normal  [] Limited []  Normal  [x] Limited  Normal B   Hamstrings  [x] Normal  [] Limited [] Normal  [x] Limited Neural tension left Normal B   Piriformis  [x] Normal  [] Limited [] Normal  [x] Limited  Normal B        SPECIAL TESTS:    Right  (spine) Left  Goal   Lumbar Distraction  [x] Positive     [] Negative --- Negative B    SLUMP [] Positive     [x] Negative [x] Positive     [] Negative Negative B    Straight Leg Raise [] Positive     [x] Negative [x] Positive     [] Negative Negative B        SENSATION:  Intact to Light Touch        PALPATION: Increased tenderness to palpation of: left paraspinals, quadratus lumborum, glutes, piriformis, deep hip rotators. I        GAIT ANALYSIS The patient ambulated with the following assistive device: none; the pt presents with the following gait abnormalities: decreased step length on left, decreased stance time on left, decreased heel strike on left, decreased push off on left, and decreased hip extension on left    FUNCTIONAL MOVEMENT PATTERNS  Movement Analysis Notes   Bed Mobility  FUNCTIONAL, PAINFUL     Transfers FUNCTIONAL, PAINFUL     Sit to Stand FUNCTIONAL, PAINFUL     Squat DYSFUNCTIONAL, PAINFUL     Single Leg Squat  Unable     Step Down  Unable    Single leg calf raises  Unable R:   L:        Treatment:        CPT  Intervention  Performed    Today  Duration / Intensity    MT  Soft Tissue Mobilizations: left gluteals and deep rotators  x      TE          ?          ?                    NMR?  Educated patient on the impairments present and the plan of care to address impairments  x        Educated patient on the proper form and sequencing of all exercises provided in [facility] today  x      ?                              TA          ?          ?                    PLAN  NuStep/Recumbent Bike   Soft Tissue and Joint Mobilizations   Manual Lumbar Traction   Piriformis Stretch   Abdominal Bracing 90/90 Holds   Heel Taps   Glute Sets   Prone Heel Squeezes   Prone Hip Extension with Knee Flexed    Seated Hamstring Curls   Soleus and Heel Raises  ?  ?      ?   ?   CPT Codes available for Billing:    (08) minutes of Manual therapy (MT) to improve pain and ROM.   (00) minutes of Therapeutic Exercise (TE) to develop strength, endurance, range of motion, and flexibility.   (12) minutes of Neuromuscular Re-Education (NMR)? to improve: Balance, Coordination, Kinesthetic, Sense, Proprioception, and Posture.   (00) minutes of Therapeutic Activities (TA) to improve functional performance.   Vasopneumatic Device Therapy () for management of swelling/edema. (56229)   Unattended Electrical Stimulation (ES) for muscle performance or pain modulation.   BFR: Blood flow restriction applied during exercise   ?        Assessment & Plan   Assessment  Christo presents with a condition of Moderate complexity.   Presentation of Symptoms: Changing  Will Comorbidities Impact Care: Yes            Patient Goal for Therapy (PT): Patient's goal is to alleviate pain and return to regular workout regimen without pain.  Prognosis: Good  Prognosis Details: Anticipated Barriers for therapy: co-morbidities, chronicity of condition, and adherence to treatment plan   Assessment Details: Pt presents with impairments in the following categories: IMPAIRMENTS: ROM, strength, endurance, muscle length, balance, posture, gait mechanics, core strength and stability, functional movement patterns, and sensation. Pt will benefit from skilled outpatient Physical Therapy to address the deficits stated above, provide pt/family education, and to maximize pt's level of independence.      Plan  From a physical therapy perspective, the patient would benefit from: Skilled Rehab Services                Visit Frequency: 2 times Per Week for 8 Weeks.       This plan was discussed with Patient.   Discussion participants: Agreed Upon Plan of Care  Plan details: Outpatient Physical Therapy to include any combination of the following interventions: virtual visits, dry  needling, modalities, electrical stimulation (IFC, Pre-Mod, Attended with Functional Dry Needling), Cervical/Lumbar Traction, Gait Training, Manual Therapy, Neuromuscular Re-ed, Patient Education, Self Care, Therapeutic Exercise, and Therapeutic Activites           Patient's spiritual, cultural, and educational needs considered and patient agreeable to plan of care and goals.     Education  Education was done with Patient. The patient's learning style includes Demonstration. The patient Demonstrates understanding.         PURPOSE: Patient educated on the impairments noted above and the effects of physical therapy intervention to improve overall condition and QOL.  EXERCISE: Patient was educated on all the above exercise prior/during/after for proper posture, positioning, and execution for safe performance with home exercise program.  STRENGTH: Patient educated on the importance of improved core and extremity strength in order to improve alignment of the spine and extremities with static positions and dynamic movement.  GAIT & BALANCE: Patient educated on the importance of strong core and lower extremity musculature in order to improve both static and dynamic balance, improve gait mechanics, reduce fall risk and improve household and community mobility.  POSTURE: Patient educated on postural awareness to reduce stress and maintain optimal alignment of the spine with static positions and dynamic movement  TRANSFERS & TRANSITIONS: Patient educated on proper technique for bed mobility, transitions and transfers to improve body mechanics and decrease risk of injury.  POLICIES: Educated patient on scheduling, cancelation and no-show policy.        Goals:   Active       Long Term Goals       Pt will report worst pain of 2/10 in order to progress toward max functional ability and improve quality of life                Start:  04/16/25    Expected End:  06/11/25            Patient will demonstrate improved function as  indicated by a score of greater than or equal to 75 out of 100 on FOTO.                    Start:  04/16/25    Expected End:  06/11/25            Patient will improve strength to at least 4+/5 grossly,  in order to improve functional independence and quality of life.         Start:  04/16/25    Expected End:  06/11/25               Short Term Goals       Pt will report worst pain of 5/10 in order to progress toward max functional ability and improve quality of life                Start:  04/16/25    Expected End:  05/14/25            Patient will improve strength by 1/2 a grade, in order to progress towards independence with functional activities.                Start:  04/16/25    Expected End:  05/14/25            Patient will demonstrate improved function as indicated by a score of greater than or equal to 65 out of 100 on FOTO.                Start:  04/16/25    Expected End:  05/14/25                Ally Sutton, PT

## 2025-04-16 PROBLEM — R68.89 DECREASED STRENGTH, ENDURANCE, AND MOBILITY: Status: ACTIVE | Noted: 2025-04-16

## 2025-04-16 PROBLEM — Z74.09 DECREASED STRENGTH, ENDURANCE, AND MOBILITY: Status: ACTIVE | Noted: 2025-04-16

## 2025-04-16 PROBLEM — R53.1 DECREASED STRENGTH, ENDURANCE, AND MOBILITY: Status: ACTIVE | Noted: 2025-04-16

## 2025-04-16 PROBLEM — M54.16 LUMBAR RADICULAR PAIN: Status: ACTIVE | Noted: 2025-04-16

## 2025-04-16 PROBLEM — Z74.09 IMPAIRED MOBILITY AND ADLS: Status: ACTIVE | Noted: 2025-04-16

## 2025-04-16 PROBLEM — Z78.9 IMPAIRED MOBILITY AND ADLS: Status: ACTIVE | Noted: 2025-04-16

## 2025-04-22 DIAGNOSIS — I10 ESSENTIAL HYPERTENSION: ICD-10-CM

## 2025-04-22 RX ORDER — LISINOPRIL 10 MG/1
10 TABLET ORAL DAILY
Qty: 90 TABLET | Refills: 1 | Status: SHIPPED | OUTPATIENT
Start: 2025-04-22

## 2025-04-22 NOTE — TELEPHONE ENCOUNTER
No care due was identified.  [facility] Embedded Care Due Messages. Reference number: [id number].   4/22/2025 11:44:47 AM CDT

## 2025-04-23 ENCOUNTER — CLINICAL SUPPORT (OUTPATIENT)
Dept: REHABILITATION | Facility: HOSPITAL | Age: 50
End: 2025-04-23
Payer: COMMERCIAL

## 2025-04-23 DIAGNOSIS — M54.16 LUMBAR RADICULAR PAIN: Primary | ICD-10-CM

## 2025-04-23 DIAGNOSIS — Z74.09 IMPAIRED MOBILITY AND ADLS: ICD-10-CM

## 2025-04-23 DIAGNOSIS — Z78.9 IMPAIRED MOBILITY AND ADLS: ICD-10-CM

## 2025-04-23 DIAGNOSIS — Z74.09 DECREASED STRENGTH, ENDURANCE, AND MOBILITY: ICD-10-CM

## 2025-04-23 DIAGNOSIS — R68.89 DECREASED STRENGTH, ENDURANCE, AND MOBILITY: ICD-10-CM

## 2025-04-23 DIAGNOSIS — R53.1 DECREASED STRENGTH, ENDURANCE, AND MOBILITY: ICD-10-CM

## 2025-04-23 PROCEDURE — 97530 THERAPEUTIC ACTIVITIES: CPT

## 2025-04-23 PROCEDURE — 97140 MANUAL THERAPY 1/> REGIONS: CPT

## 2025-04-23 PROCEDURE — 97112 NEUROMUSCULAR REEDUCATION: CPT

## 2025-04-24 NOTE — PROGRESS NOTES
Outpatient Rehab    Physical Therapy Visit    Patient Name: Christo Banegas  MRN: [id number]  YOB: 1975  Encounter Date: 4/23/2025    Therapy Diagnosis:   Encounter Diagnoses   Name Primary?    Lumbar radicular pain Yes    Decreased strength, endurance, and mobility     Impaired mobility and ADLs      Physician: Jelly Chaney DO    Physician Orders: Eval and Treat  Medical Diagnosis: Left sided sciatica    Visit # / Visits Authorized:  1 / 20  Insurance Authorization Period: 4/14/2025 to 12/31/2025  Date of Evaluation: 4/14/2025  Plan of Care Certification: 4/14/2025 to 6/11/2025     PT/PTA:     Number of PTA visits since last PT visit:   Time In: 1618   Time Out: 1727  Total Time: 69   Total Billable Time: 69    FOTO:  Intake Score:  %  Survey Score 1:  %  Survey Score 2:  %         Subjective   Patient reports that his pain is okay right now. Did have slowness and difficulty rising from chair due to stiffness present. Has not returned to the gym yet. Has noticed that he has some days where his pain improves throughout the day and other day where the pain remains throughout..  Pain reported as 0/10. left low back, buttock, lower extremity    Objective            Treatment:        CPT  Intervention  Performed    Today  Duration / Intensity    MT  Soft Tissue Mobilizations: left gluteals and deep rotators, bilateral lumbar/thoracic paraspinals, quadratus lumborum  x      TE          ?          ?                    NMR?  Pelvic Tilts  x  3x10 reps 3 sec holds       Abdominal Bracing 90/90 holds  x  2x10 reps      90/90 Heel Taps  x  2x10 reps     ?  Prone Glutes Squeezes  x  3x10 reps       Recumbent Bike  x  8 minutes               TA  Standing Hamstring Curls  x  2x10 reps left     ?  Standing Heel Raises  x  3x10 reps     ?  Soleus Raises from chair  x  3x10 reps       Standing Dorsiflexion- performed in unison  x  3x10 reps     PLAN  NuStep   Soft Tissue and Joint Mobilizations   Manual Lumbar  Traction   Piriformis Stretch   Glute Sets   Prone Hip Extension with Knee Flexed   Seated Hamstring Curls  ?  ?      ?   ?   CPT Codes available for Billing:    (15) minutes of Manual therapy (MT) to improve pain and ROM.   (00) minutes of Therapeutic Exercise (TE) to develop strength, endurance, range of motion, and flexibility.   (28) minutes of Neuromuscular Re-Education (NMR)? to improve: Balance, Coordination, Kinesthetic, Sense, Proprioception, and Posture.   (25) minutes of Therapeutic Activities (TA) to improve functional performance.   Vasopneumatic Device Therapy () for management of swelling/edema. (03198)   Unattended Electrical Stimulation (ES) for muscle performance or pain modulation.   BFR: Blood flow restriction applied during exercise   ?          Assessment & Plan   Assessment: Patient noted with muscular restrictions with associated tenderness/pain throughout thoracic/lumbar along with gluteal region which alleviated with manual therapy interventions. Revisited some of his HEP exercises and updated HEP to incorporate new exercises performed today. Patient required cueing for proper muscle activation and to maintain form throughout. Hamstring curls were performed in standing position as prone lying was uncomfortable for the patient. These proved to be very challenging for the patient. Patient reported feeling good and without pain upon exiting session today.  Evaluation/Treatment Tolerance: Patient tolerated treatment well    Patient will continue to benefit from skilled outpatient physical therapy to address the deficits listed in the problem list box on initial evaluation, provide pt/family education and to maximize pt's level of independence in the home and community environment.     Patient's spiritual, cultural, and educational needs considered and patient agreeable to plan of care and goals.           Plan: Continue POC and frequency as planned. Adjust/modify treatment based off of  previous response to treatment.    Goals:   Active       Long Term Goals       Pt will report worst pain of 2/10 in order to progress toward max functional ability and improve quality of life                Start:  04/16/25    Expected End:  06/11/25            Patient will demonstrate improved function as indicated by a score of greater than or equal to 75 out of 100 on FOTO.                    Start:  04/16/25    Expected End:  06/11/25            Patient will improve strength to at least 4+/5 grossly,  in order to improve functional independence and quality of life.         Start:  04/16/25    Expected End:  06/11/25               Short Term Goals       Pt will report worst pain of 5/10 in order to progress toward max functional ability and improve quality of life                Start:  04/16/25    Expected End:  05/14/25            Patient will improve strength by 1/2 a grade, in order to progress towards independence with functional activities.                Start:  04/16/25    Expected End:  05/14/25            Patient will demonstrate improved function as indicated by a score of greater than or equal to 65 out of 100 on FOTO.                Start:  04/16/25    Expected End:  05/14/25                Ally Sutton, PT

## 2025-04-25 ENCOUNTER — CLINICAL SUPPORT (OUTPATIENT)
Dept: REHABILITATION | Facility: HOSPITAL | Age: 50
End: 2025-04-25
Payer: COMMERCIAL

## 2025-04-25 DIAGNOSIS — M54.16 LUMBAR RADICULAR PAIN: Primary | ICD-10-CM

## 2025-04-25 DIAGNOSIS — R68.89 DECREASED STRENGTH, ENDURANCE, AND MOBILITY: ICD-10-CM

## 2025-04-25 DIAGNOSIS — Z74.09 IMPAIRED MOBILITY AND ADLS: ICD-10-CM

## 2025-04-25 DIAGNOSIS — R53.1 DECREASED STRENGTH, ENDURANCE, AND MOBILITY: ICD-10-CM

## 2025-04-25 DIAGNOSIS — Z74.09 DECREASED STRENGTH, ENDURANCE, AND MOBILITY: ICD-10-CM

## 2025-04-25 DIAGNOSIS — Z78.9 IMPAIRED MOBILITY AND ADLS: ICD-10-CM

## 2025-04-25 PROCEDURE — 97112 NEUROMUSCULAR REEDUCATION: CPT

## 2025-04-25 PROCEDURE — 97140 MANUAL THERAPY 1/> REGIONS: CPT

## 2025-04-25 PROCEDURE — 97530 THERAPEUTIC ACTIVITIES: CPT

## 2025-04-25 NOTE — PROGRESS NOTES
Outpatient Rehab    Physical Therapy Visit    Patient Name: Christo Banegas  MRN: [id number]  YOB: 1975  Encounter Date: 4/25/2025    Therapy Diagnosis:   Encounter Diagnoses   Name Primary?    Lumbar radicular pain Yes    Decreased strength, endurance, and mobility     Impaired mobility and ADLs      Physician: Jelly Chaney DO    Physician Orders: Eval and Treat  Medical Diagnosis: Left sided sciatica    Visit # / Visits Authorized:  2 / 20  Insurance Authorization Period: 4/14/2025 to 12/31/2025  Date of Evaluation: 4/14/2025  Plan of Care Certification: 4/14/2025 to 6/11/2025     PT/PTA:     Number of PTA visits since last PT visit:   Time In: 1220   Time Out: 1330  Total Time: 70   Total Billable Time: 70    FOTO:  Intake Score:  %  Survey Score 1:  %  Survey Score 2:  %         Subjective   Patient reports that he has been feeling okay. Did have pain last night. 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  Pain reported as 0/10. left low back, buttock, lower extremity, 2/10 when sitting on recumbent bike    Objective            Treatment:        CPT  Intervention  Performed    Today  Duration / Intensity    MT  Soft Tissue Mobilizations: left gluteals and deep rotators, bilateral lumbar/thoracic paraspinals, quadratus lumborum  x      TE          ?          ?                    NMR?  Pelvic Tilts  x  3x10 reps 3 sec holds       Abdominal Bracing 90/90 holds  x  2x10 reps      90/90 Heel Taps  x  3x10 reps     ?  Supine Glutes Squeezes- knees extended  x  3x10 reps       Recumbent Bike  x  8 minutes       Side Lying Clams  x  2x10 reps no band, 3x10 reps green band       Bridges  x  3x10 reps     TA  Standing Hamstring Curls  x  2x10 reps left     ?  Standing Heel Raises  x  3x10 reps     ?  Soleus Raises from chair    3x10 reps       Standing Dorsiflexion- performed in unison  x  3x10 reps     PLAN  NuStep   Soft Tissue and Joint Mobilizations   Manual Lumbar Traction    Piriformis Stretch   Glute Sets   Prone Hip Extension with Knee Flexed   Seated Hamstring Curls  ?  ?      ?   ?   CPT Codes available for Billing:    (15) minutes of Manual therapy (MT) to improve pain and ROM.   (00) minutes of Therapeutic Exercise (TE) to develop strength, endurance, range of motion, and flexibility.   (40) minutes of Neuromuscular Re-Education (NMR)? to improve: Balance, Coordination, Kinesthetic, Sense, Proprioception, and Posture.   (15) minutes of Therapeutic Activities (TA) to improve functional performance.   Vasopneumatic Device Therapy () for management of swelling/edema. (41327)   Unattended Electrical Stimulation (ES) for muscle performance or pain modulation.   BFR: Blood flow restriction applied during exercise   ?         Assessment & Plan   Assessment: Patient continued to have tenderness through gluteals and deep rotators of the hip which improved with manual therapy interventions. Continued to work on core and glutes strengthening. Incorporated side lying clams and bridges to progress glutes strength. Patient with much more fatigue as expected on left side as compared to right. Better endurance with hamstring curls today on left side although still very challenging.  Evaluation/Treatment Tolerance: Patient tolerated treatment well    Patient will continue to benefit from skilled outpatient physical therapy to address the deficits listed in the problem list box on initial evaluation, provide pt/family education and to maximize pt's level of independence in the home and community environment.     Patient's spiritual, cultural, and educational needs considered and patient agreeable to plan of care and goals.           Plan: Continue POC and frequency as planned. Adjust/modify treatment based off of previous response to treatment.    Goals:   Active       Long Term Goals       Pt will report worst pain of 2/10 in order to progress toward max functional ability and improve quality  of life                Start:  04/16/25    Expected End:  06/11/25            Patient will demonstrate improved function as indicated by a score of greater than or equal to 75 out of 100 on FOTO.                    Start:  04/16/25    Expected End:  06/11/25            Patient will improve strength to at least 4+/5 grossly,  in order to improve functional independence and quality of life.         Start:  04/16/25    Expected End:  06/11/25               Short Term Goals       Pt will report worst pain of 5/10 in order to progress toward max functional ability and improve quality of life                Start:  04/16/25    Expected End:  05/14/25            Patient will improve strength by 1/2 a grade, in order to progress towards independence with functional activities.                Start:  04/16/25    Expected End:  05/14/25            Patient will demonstrate improved function as indicated by a score of greater than or equal to 65 out of 100 on FOTO.                Start:  04/16/25    Expected End:  05/14/25                Ally Sutton, PT

## 2025-04-28 ENCOUNTER — CLINICAL SUPPORT (OUTPATIENT)
Dept: REHABILITATION | Facility: HOSPITAL | Age: 50
End: 2025-04-28
Payer: COMMERCIAL

## 2025-04-28 DIAGNOSIS — M54.16 LUMBAR RADICULAR PAIN: Primary | ICD-10-CM

## 2025-04-28 DIAGNOSIS — R68.89 DECREASED STRENGTH, ENDURANCE, AND MOBILITY: ICD-10-CM

## 2025-04-28 DIAGNOSIS — Z74.09 IMPAIRED MOBILITY AND ADLS: ICD-10-CM

## 2025-04-28 DIAGNOSIS — R53.1 DECREASED STRENGTH, ENDURANCE, AND MOBILITY: ICD-10-CM

## 2025-04-28 DIAGNOSIS — Z74.09 DECREASED STRENGTH, ENDURANCE, AND MOBILITY: ICD-10-CM

## 2025-04-28 DIAGNOSIS — Z78.9 IMPAIRED MOBILITY AND ADLS: ICD-10-CM

## 2025-04-28 PROCEDURE — 97140 MANUAL THERAPY 1/> REGIONS: CPT

## 2025-04-28 PROCEDURE — 97112 NEUROMUSCULAR REEDUCATION: CPT

## 2025-04-28 PROCEDURE — 97530 THERAPEUTIC ACTIVITIES: CPT

## 2025-04-30 NOTE — PROGRESS NOTES
Outpatient Rehab    Physical Therapy Visit    Patient Name: Christo Banegas  MRN: [id number]  YOB: 1975  Encounter Date: 4/28/2025    Therapy Diagnosis:   Encounter Diagnoses   Name Primary?    Lumbar radicular pain Yes    Decreased strength, endurance, and mobility     Impaired mobility and ADLs      Physician: Jelly Chaney DO    Physician Orders: Eval and Treat  Medical Diagnosis: Left sided sciatica    Visit # / Visits Authorized:  3 / 20  Insurance Authorization Period: 4/14/2025 to 12/31/2025  Date of Evaluation: 4/14/2025  Plan of Care Certification: 4/14/2025 to 6/11/2025     PT/PTA:     Number of PTA visits since last PT visit:   Time In: 1520   Time Out: 1630  Total Time: 70   Total Billable Time: 70    FOTO:  Intake Score:  %  Survey Score 1:  %  Survey Score 2:  %         Subjective   Patient reports that he is not having any pain right now but feels pain as soon as he sits down on the recumbent bike. Was pretty sore after last visit..  Pain reported as 0/10. left low back, buttock, lower extremity, 2/10 when sitting on recumbent bike    Objective            Treatment:        CPT  Intervention  Performed    Today  Duration / Intensity    MT  Soft Tissue Mobilizations: left gluteals and deep rotators, bilateral lumbar/thoracic paraspinals, quadratus lumborum  x      TE          ?          ?                    NMR?  Pelvic Tilts  x  3x10 reps 3 sec holds       Abdominal Bracing 90/90 holds    2x10 reps      90/90 Heel Taps    3x10 reps     ?  Supine Glutes Squeezes- knees extended  x  3x10 reps       Recumbent Bike  x  8 minutes       Side Lying Clams  x  3x10 reps no band, 3x10 reps green band       Bridges  x  3x10 reps       Prone Hamstring Curls  x  3x10 reps left      Prone Hip Extension with Knee Flexed  x  3x10 reps B      Seated Internal Rotation- ball between knees, red band around ankles  x  3x10 reps     TA  Standing Hamstring Curls    2x10 reps left     ?  Standing Heel Raises   x  3x10 reps     ?  Soleus Raises from chair    3x10 reps       Standing Dorsiflexion- performed in unison  x  3x10 reps     PLAN  NuStep   Soft Tissue and Joint Mobilizations   Manual Lumbar Traction   Piriformis Stretch   Glute Sets   Prone Hip Extension with Knee Flexed   Seated Hamstring Curls  ?  ?      ?   CPT Codes available for Billing:    (14) minutes of Manual therapy (MT) to improve pain and ROM.   (00) minutes of Therapeutic Exercise (TE) to develop strength, endurance, range of motion, and flexibility.   (48) minutes of Neuromuscular Re-Education (NMR)? to improve: Balance, Coordination, Kinesthetic, Sense, Proprioception, and Posture.   (08) minutes of Therapeutic Activities (TA) to improve functional performance.   Vasopneumatic Device Therapy () for management of swelling/edema. (64551)   Unattended Electrical Stimulation (ES) for muscle performance or pain modulation.   BFR: Blood flow restriction applied during exercise   ? ?         Assessment & Plan   Assessment: Patient with continued tenderness in deep rotators and gluteals especially following prone hamstring curls and prone hip extensions performed today. Patient with considerable fatigue with these incorporated today on the left side. Will continue to progress as able without exacerbating symptoms.  Evaluation/Treatment Tolerance: Patient tolerated treatment well    Patient will continue to benefit from skilled outpatient physical therapy to address the deficits listed in the problem list box on initial evaluation, provide pt/family education and to maximize pt's level of independence in the home and community environment.     Patient's spiritual, cultural, and educational needs considered and patient agreeable to plan of care and goals.           Plan: Continue POC and frequency as planned. Adjust/modify treatment based off of previous response to treatment.    Goals:   Active       Long Term Goals       Pt will report worst pain of 2/10  in order to progress toward max functional ability and improve quality of life                Start:  04/16/25    Expected End:  06/11/25            Patient will demonstrate improved function as indicated by a score of greater than or equal to 75 out of 100 on FOTO.                    Start:  04/16/25    Expected End:  06/11/25            Patient will improve strength to at least 4+/5 grossly,  in order to improve functional independence and quality of life.         Start:  04/16/25    Expected End:  06/11/25               Short Term Goals       Pt will report worst pain of 5/10 in order to progress toward max functional ability and improve quality of life                Start:  04/16/25    Expected End:  05/14/25            Patient will improve strength by 1/2 a grade, in order to progress towards independence with functional activities.                Start:  04/16/25    Expected End:  05/14/25            Patient will demonstrate improved function as indicated by a score of greater than or equal to 65 out of 100 on FOTO.                Start:  04/16/25    Expected End:  05/14/25                Ally Sutton, PT

## 2025-05-02 ENCOUNTER — CLINICAL SUPPORT (OUTPATIENT)
Dept: REHABILITATION | Facility: HOSPITAL | Age: 50
End: 2025-05-02
Payer: COMMERCIAL

## 2025-05-02 DIAGNOSIS — M54.16 LUMBAR RADICULAR PAIN: Primary | ICD-10-CM

## 2025-05-02 DIAGNOSIS — Z74.09 IMPAIRED MOBILITY AND ADLS: ICD-10-CM

## 2025-05-02 DIAGNOSIS — R53.1 DECREASED STRENGTH, ENDURANCE, AND MOBILITY: ICD-10-CM

## 2025-05-02 DIAGNOSIS — R68.89 DECREASED STRENGTH, ENDURANCE, AND MOBILITY: ICD-10-CM

## 2025-05-02 DIAGNOSIS — Z74.09 DECREASED STRENGTH, ENDURANCE, AND MOBILITY: ICD-10-CM

## 2025-05-02 DIAGNOSIS — Z78.9 IMPAIRED MOBILITY AND ADLS: ICD-10-CM

## 2025-05-02 PROCEDURE — 97530 THERAPEUTIC ACTIVITIES: CPT

## 2025-05-02 PROCEDURE — 97140 MANUAL THERAPY 1/> REGIONS: CPT

## 2025-05-02 PROCEDURE — 97112 NEUROMUSCULAR REEDUCATION: CPT

## 2025-05-02 NOTE — PROGRESS NOTES
Outpatient Rehab    Physical Therapy Visit    Patient Name: Christo Banegas  MRN: [id number]  YOB: 1975  Encounter Date: 5/2/2025    Therapy Diagnosis:   Encounter Diagnoses   Name Primary?    Lumbar radicular pain Yes    Decreased strength, endurance, and mobility     Impaired mobility and ADLs        Physician: Jelly Chaney DO    Physician Orders: Eval and Treat  Medical Diagnosis: Left sided sciatica    Visit # / Visits Authorized:  4 / 20  Insurance Authorization Period: 4/14/2025 to 12/31/2025  Date of Evaluation: 4/14/2025  Plan of Care Certification: 4/14/2025 to 6/11/2025     PT/PTA:     Number of PTA visits since last PT visit:   Time In: 1530   Time Out: 1630  Total Time: 60   Total Billable Time:  60    FOTO:  Intake Score:  %  Survey Score 1:  %  Survey Score 2:  %         Subjective   Patient states his entire left leg feels very tight especially in his calf and hamstring. No pain reported..  Pain reported as 0/10. left low back, buttock, lower extremity, 2/10 when sitting on recumbent bike    Objective            Treatment:        CPT  Intervention  Performed    Today  Duration / Intensity    MT  Soft Tissue Mobilizations: left gluteals and deep rotators, bilateral lumbar/thoracic paraspinals, quadratus lumborum        Cupping with lumbar flexion over exercise ball x 8 minutes   TE          ?          ?                    NMR?  Pelvic Tilts  x  3x10 reps 3 sec holds       Abdominal Bracing 90/90 holds    2x10 reps      90/90 Heel Taps    3x10 reps     ?  Supine Glutes Squeezes- knees extended  x  3x10 reps      elliptical x 6 minutes     Recumbent Bike   8 minutes       Side Lying Clams   3x10 reps no band, 3x10 reps green band      Supine sciatic nerve glides with ankle DORSIFLEXION  x 3 x 10 B     Bridges  x  3x10 reps       Prone Hamstring Curls  x  3x10 reps left      Prone Hip Extension with Knee Flexed  x  3x10 reps B      Seated Internal Rotation- ball between knees, red band  around ankles  - 3x10 reps     TA  Standing Hamstring Curls    2x10 reps left      Shuttle squat single leg x 3 x 10 bilateral 5 bands    Shuttle squat piriformis x 2 minutes 3 bands each bilateral   ?  Standing Heel Raises  - 3x10 reps     ?  Soleus Raises from chair    3x10 reps       Standing Dorsiflexion- performed in unison  - 3x10 reps     PLAN  NuStep   Soft Tissue and Joint Mobilizations   Manual Lumbar Traction   Piriformis Stretch   Glute Sets   Prone Hip Extension with Knee Flexed   Seated Hamstring Curls  ?  ?      ?   CPT Codes available for Billing:    (08) minutes of Manual therapy (MT) to improve pain and ROM.   (00) minutes of Therapeutic Exercise (TE) to develop strength, endurance, range of motion, and flexibility.   (42) minutes of Neuromuscular Re-Education (NMR)? to improve: Balance, Coordination, Kinesthetic, Sense, Proprioception, and Posture.   (10) minutes of Therapeutic Activities (TA) to improve functional performance.   Vasopneumatic Device Therapy () for management of swelling/edema. (30325)   Unattended Electrical Stimulation (ES) for muscle performance or pain modulation.   BFR: Blood flow restriction applied during exercise   ? ?         Assessment & Plan   Assessment: Patient tolerated all treatment well and was able to complete program as noted this treatment session. Cues for all interventions for correct technique and to avoid substitution patterns. Encouraged HEP       Patient will continue to benefit from skilled outpatient physical therapy to address the deficits listed in the problem list box on initial evaluation, provide pt/family education and to maximize pt's level of independence in the home and community environment.     Patient's spiritual, cultural, and educational needs considered and patient agreeable to plan of care and goals.           Plan: Continue POC and frequency as planned. Adjust/modify treatment based off of previous response to treatment.    Goals:    Active       Long Term Goals       Pt will report worst pain of 2/10 in order to progress toward max functional ability and improve quality of life                Start:  04/16/25    Expected End:  06/11/25            Patient will demonstrate improved function as indicated by a score of greater than or equal to 75 out of 100 on FOTO.                    Start:  04/16/25    Expected End:  06/11/25            Patient will improve strength to at least 4+/5 grossly,  in order to improve functional independence and quality of life.         Start:  04/16/25    Expected End:  06/11/25               Short Term Goals       Pt will report worst pain of 5/10 in order to progress toward max functional ability and improve quality of life                Start:  04/16/25    Expected End:  05/14/25            Patient will improve strength by 1/2 a grade, in order to progress towards independence with functional activities.                Start:  04/16/25    Expected End:  05/14/25            Patient will demonstrate improved function as indicated by a score of greater than or equal to 65 out of 100 on FOTO.                Start:  04/16/25    Expected End:  05/14/25                Pili Hampton, PT

## 2025-05-06 ENCOUNTER — CLINICAL SUPPORT (OUTPATIENT)
Dept: REHABILITATION | Facility: HOSPITAL | Age: 50
End: 2025-05-06
Payer: COMMERCIAL

## 2025-05-06 DIAGNOSIS — M54.16 LUMBAR RADICULAR PAIN: Primary | ICD-10-CM

## 2025-05-06 DIAGNOSIS — Z78.9 IMPAIRED MOBILITY AND ADLS: ICD-10-CM

## 2025-05-06 DIAGNOSIS — R68.89 DECREASED STRENGTH, ENDURANCE, AND MOBILITY: ICD-10-CM

## 2025-05-06 DIAGNOSIS — R53.1 DECREASED STRENGTH, ENDURANCE, AND MOBILITY: ICD-10-CM

## 2025-05-06 DIAGNOSIS — Z74.09 IMPAIRED MOBILITY AND ADLS: ICD-10-CM

## 2025-05-06 DIAGNOSIS — Z74.09 DECREASED STRENGTH, ENDURANCE, AND MOBILITY: ICD-10-CM

## 2025-05-06 PROCEDURE — 97140 MANUAL THERAPY 1/> REGIONS: CPT

## 2025-05-06 PROCEDURE — 97530 THERAPEUTIC ACTIVITIES: CPT

## 2025-05-06 PROCEDURE — 97112 NEUROMUSCULAR REEDUCATION: CPT

## 2025-05-06 NOTE — PROGRESS NOTES
Outpatient Rehab    Physical Therapy Visit    Patient Name: Christo Banegas  MRN: [id number]  YOB: 1975  Encounter Date: 5/6/2025    Therapy Diagnosis:   Encounter Diagnoses   Name Primary?    Lumbar radicular pain Yes    Decreased strength, endurance, and mobility     Impaired mobility and ADLs        Physician: Jelly Chaney DO    Physician Orders: Eval and Treat  Medical Diagnosis: Left sided sciatica    Visit # / Visits Authorized:  5 / 20  Insurance Authorization Period: 4/14/2025 to 12/31/2025  Date of Evaluation: 4/14/2025  Plan of Care Certification: 4/14/2025 to 6/11/2025     PT/PTA:     Number of PTA visits since last PT visit:   Time In: 0931   Time Out: 1030  Total Time: 59   Total Billable Time: 59    FOTO:  Intake Score:  %  Survey Score 1:  %  Survey Score 2:  %         Subjective   Patient reports that he walked for a few hours on a job site yesterday totalling about 4 miles on his feet. Patient reports that he was limping by the end of the day. Woke up last night with the calf tightness on his left side which he has experienced in a while..  Pain reported as 0/10. left low back, buttock, lower extremity, 2/10 when sitting on recumbent bike    Objective            Treatment:        CPT  Intervention  Performed    Today  Duration / Intensity    MT  Soft Tissue Mobilizations: left hamstrings, gastrocnemius and soleus   x      TE          ?          ?                    NMR?  Pelvic Tilts    3x10 reps 3 sec holds       Abdominal Bracing 90/90 holds    2x10 reps      90/90 Heel Taps    3x10 reps     ?  Supine Glutes Squeezes- knees extended    3x10 reps       Recumbent Bike  x  8 minutes       Side Lying Clams  x  2x10 reps no band, 3x10 reps green band       Bridges- single leg bridge  x  3x10 reps       Prone Hamstring Curls  x  3x10 reps left      Prone Hip Extension with Knee Flexed  x  3x10 reps B      Seated Internal Rotation- ball between knees, red band around ankles    3x10 reps        Side Lying Hip Adduction  x  3x10 reps left       Side Lying Hip Abduction  x  2x10 reps left     TA  Standing Hamstring Curls    2x10 reps left     ?  Standing Heel Raises- two up one down left   x  3x10 reps     ?  Soleus Raises from chair  x  3x10 reps 25 lbs      Standing Dorsiflexion- performed in unison    3x10 reps     PLAN  NuStep   Soft Tissue and Joint Mobilizations   Manual Lumbar Traction   Piriformis Stretch   Glute Sets   Prone Hip Extension with Knee Flexed   Seated Hamstring Curls  ?  ?      ?   ?   CPT Codes available for Billing:    (08) minutes of Manual therapy (MT) to improve pain and ROM.   (00) minutes of Therapeutic Exercise (TE) to develop strength, endurance, range of motion, and flexibility.   (43) minutes of Neuromuscular Re-Education (NMR)? to improve: Balance, Coordination, Kinesthetic, Sense, Proprioception, and Posture.   (08) minutes of Therapeutic Activities (TA) to improve functional performance.   Vasopneumatic Device Therapy () for management of swelling/edema. (54944)   Unattended Electrical Stimulation (ES) for muscle performance or pain modulation.   BFR: Blood flow restriction applied during exercise   ?      Assessment & Plan   Assessment: Patient reported improvements in tightness and muscle tension in calf following soft tissue mobilizations. Patient with more fatigue today with side lying clams so less repetitions were performed along with incorporating side lying hip abduction. Will continue to progress strength and functional mobility as tolerated. Discussed possibility seeing MD again and having imaging done if he does not continue to see progress, although progress has been seen thus far by tolerance to activity.  Evaluation/Treatment Tolerance: Patient tolerated treatment well    Patient will continue to benefit from skilled outpatient physical therapy to address the deficits listed in the problem list box on initial evaluation, provide pt/family education and  to maximize pt's level of independence in the home and community environment.     Patient's spiritual, cultural, and educational needs considered and patient agreeable to plan of care and goals.           Plan:      Goals:   Active       Long Term Goals       Pt will report worst pain of 2/10 in order to progress toward max functional ability and improve quality of life                Start:  04/16/25    Expected End:  06/11/25            Patient will demonstrate improved function as indicated by a score of greater than or equal to 75 out of 100 on FOTO.                    Start:  04/16/25    Expected End:  06/11/25            Patient will improve strength to at least 4+/5 grossly,  in order to improve functional independence and quality of life.         Start:  04/16/25    Expected End:  06/11/25               Short Term Goals       Pt will report worst pain of 5/10 in order to progress toward max functional ability and improve quality of life                Start:  04/16/25    Expected End:  05/14/25            Patient will improve strength by 1/2 a grade, in order to progress towards independence with functional activities.                Start:  04/16/25    Expected End:  05/14/25            Patient will demonstrate improved function as indicated by a score of greater than or equal to 65 out of 100 on FOTO.                Start:  04/16/25    Expected End:  05/14/25                Ally Sutton, PT

## 2025-05-09 ENCOUNTER — CLINICAL SUPPORT (OUTPATIENT)
Dept: REHABILITATION | Facility: HOSPITAL | Age: 50
End: 2025-05-09
Payer: COMMERCIAL

## 2025-05-09 DIAGNOSIS — Z78.9 IMPAIRED MOBILITY AND ADLS: ICD-10-CM

## 2025-05-09 DIAGNOSIS — R68.89 DECREASED STRENGTH, ENDURANCE, AND MOBILITY: ICD-10-CM

## 2025-05-09 DIAGNOSIS — Z74.09 DECREASED STRENGTH, ENDURANCE, AND MOBILITY: ICD-10-CM

## 2025-05-09 DIAGNOSIS — R53.1 DECREASED STRENGTH, ENDURANCE, AND MOBILITY: ICD-10-CM

## 2025-05-09 DIAGNOSIS — Z74.09 IMPAIRED MOBILITY AND ADLS: ICD-10-CM

## 2025-05-09 DIAGNOSIS — M54.16 LUMBAR RADICULAR PAIN: Primary | ICD-10-CM

## 2025-05-09 PROCEDURE — 97140 MANUAL THERAPY 1/> REGIONS: CPT

## 2025-05-09 PROCEDURE — 97112 NEUROMUSCULAR REEDUCATION: CPT

## 2025-05-09 PROCEDURE — 97530 THERAPEUTIC ACTIVITIES: CPT

## 2025-05-09 NOTE — PROGRESS NOTES
Outpatient Rehab    Physical Therapy Visit    Patient Name: Christo Banegas  MRN: [id number]  YOB: 1975  Encounter Date: 5/9/2025    Therapy Diagnosis:   Encounter Diagnoses   Name Primary?    Lumbar radicular pain Yes    Decreased strength, endurance, and mobility     Impaired mobility and ADLs        Physician: Jelly Chaney DO    Physician Orders: Eval and Treat  Medical Diagnosis: Left sided sciatica    Visit # / Visits Authorized:  6 / 20  Insurance Authorization Period: 4/14/2025 to 12/31/2025  Date of Evaluation: 4/14/2025  Plan of Care Certification: 4/14/2025 to 6/11/2025     PT/PTA:     Number of PTA visits since last PT visit:   Time In: 1330   Time Out: 1430  Total Time: 60   Total Billable Time:  60    FOTO:  Intake Score:  %  Survey Score 1:  %  Survey Score 2:  %         Subjective   Patient states he does not feel like he has been progressing and is frustrated with where he is at but is trying to stay positive..  Pain reported as 0/10. left low back, buttock, lower extremity, 2/10 when sitting on recumbent bike    Objective            Treatment:        CPT  Intervention  Performed    Today  Duration / Intensity    MT  Soft Tissue Mobilizations: left hamstrings, gastrocnemius and soleus   x      TE          ?          ?                    NMR?  Pelvic Tilts    3x10 reps 3 sec holds       Abdominal Bracing 90/90 holds    2x10 reps      90/90 Heel Taps    3x10 reps     ?  Supine Glutes Squeezes- knees extended    3x10 reps       Recumbent Bike  x  6 minutes       Side Lying Clams  - 2x10 reps no band, 3x10 reps green band       Bridges- single leg bridge  x  3x10 reps       Prone Hamstring Curls  x  3x10 reps left      Prone Hip Extension with Knee Flexed  x  3x10 reps B      Seated Internal Rotation- ball between knees, red band around ankles    3x10 reps       Side Lying Hip Adduction  x  3x10 reps left       Side Lying Hip Abduction  x  2x10 reps left     TA  Standing Hamstring Curls     2x10 reps left      Matrix Hamstring Curl single leg left x 3x10 left   ?  Standing Heel Raises- two up one down left   x  3x10 reps     ?  Soleus Raises from chair  - 3x10 reps 25 lbs      Standing Dorsiflexion- performed in unison    3x10 reps     PLAN  NuStep   Soft Tissue and Joint Mobilizations   Manual Lumbar Traction   Piriformis Stretch   Glute Sets   Prone Hip Extension with Knee Flexed   Seated Hamstring Curls  ?  ?      ?   ?   CPT Codes available for Billing:    (08) minutes of Manual therapy (MT) to improve pain and ROM.   (00) minutes of Therapeutic Exercise (TE) to develop strength, endurance, range of motion, and flexibility.   (39) minutes of Neuromuscular Re-Education (NMR)? to improve: Balance, Coordination, Kinesthetic, Sense, Proprioception, and Posture.   (13) minutes of Therapeutic Activities (TA) to improve functional performance.   Vasopneumatic Device Therapy () for management of swelling/edema. (83180)   Unattended Electrical Stimulation (ES) for muscle performance or pain modulation.   BFR: Blood flow restriction applied during exercise   ?      Assessment & Plan   Assessment: Patient tolerated all treatment well and was able to complete program as noted this treatment session. Cues for all interventions for correct technique and to avoid substitution patterns. Encouraged HEP. Patient noted to still present with excessive tenderness in left biceps femoris, however, patient's performance of hamstring curls on this side did increase.       Patient will continue to benefit from skilled outpatient physical therapy to address the deficits listed in the problem list box on initial evaluation, provide pt/family education and to maximize pt's level of independence in the home and community environment.     Patient's spiritual, cultural, and educational needs considered and patient agreeable to plan of care and goals.           Plan: Continue POC and frequency as planned. Adjust/modify  treatment based off of previous response to treatment.    Goals:   Active       Long Term Goals       Pt will report worst pain of 2/10 in order to progress toward max functional ability and improve quality of life                Start:  04/16/25    Expected End:  06/11/25            Patient will demonstrate improved function as indicated by a score of greater than or equal to 75 out of 100 on FOTO.                    Start:  04/16/25    Expected End:  06/11/25            Patient will improve strength to at least 4+/5 grossly,  in order to improve functional independence and quality of life.         Start:  04/16/25    Expected End:  06/11/25               Short Term Goals       Pt will report worst pain of 5/10 in order to progress toward max functional ability and improve quality of life                Start:  04/16/25    Expected End:  05/14/25            Patient will improve strength by 1/2 a grade, in order to progress towards independence with functional activities.                Start:  04/16/25    Expected End:  05/14/25            Patient will demonstrate improved function as indicated by a score of greater than or equal to 65 out of 100 on FOTO.                Start:  04/16/25    Expected End:  05/14/25                Pili Hampton, PT

## 2025-05-13 ENCOUNTER — CLINICAL SUPPORT (OUTPATIENT)
Dept: REHABILITATION | Facility: HOSPITAL | Age: 50
End: 2025-05-13
Payer: COMMERCIAL

## 2025-05-13 DIAGNOSIS — Z74.09 IMPAIRED MOBILITY AND ADLS: ICD-10-CM

## 2025-05-13 DIAGNOSIS — M54.16 LUMBAR RADICULAR PAIN: Primary | ICD-10-CM

## 2025-05-13 DIAGNOSIS — R53.1 DECREASED STRENGTH, ENDURANCE, AND MOBILITY: ICD-10-CM

## 2025-05-13 DIAGNOSIS — R68.89 DECREASED STRENGTH, ENDURANCE, AND MOBILITY: ICD-10-CM

## 2025-05-13 DIAGNOSIS — Z74.09 DECREASED STRENGTH, ENDURANCE, AND MOBILITY: ICD-10-CM

## 2025-05-13 DIAGNOSIS — Z78.9 IMPAIRED MOBILITY AND ADLS: ICD-10-CM

## 2025-05-13 PROCEDURE — 97112 NEUROMUSCULAR REEDUCATION: CPT

## 2025-05-13 PROCEDURE — 97530 THERAPEUTIC ACTIVITIES: CPT

## 2025-05-13 PROCEDURE — 97140 MANUAL THERAPY 1/> REGIONS: CPT

## 2025-05-13 NOTE — PROGRESS NOTES
Outpatient Rehab    Physical Therapy Visit    Patient Name: Christo Banegas  MRN: [id number]  YOB: 1975  Encounter Date: 5/13/2025    Therapy Diagnosis:   Encounter Diagnoses   Name Primary?    Lumbar radicular pain Yes    Decreased strength, endurance, and mobility     Impaired mobility and ADLs          Physician: Jelly Chaney DO    Physician Orders: Eval and Treat  Medical Diagnosis: Left sided sciatica    Visit # / Visits Authorized:  7 / 20  Insurance Authorization Period: 4/14/2025 to 12/31/2025  Date of Evaluation: 4/14/2025  Plan of Care Certification: 4/14/2025 to 6/11/2025     PT/PTA:     Number of PTA visits since last PT visit:   Time In: 1325   Time Out: 1430  Total Time: 65   Total Billable Time: 65    FOTO:  Intake Score:  %  Survey Score 1:  %  Survey Score 2:  %         Subjective   Patient feels like he is slowly making some progress as certain activities are not as challenging as they used to be..  Pain reported as 0/10. left low back, buttock, lower extremity, 2/10 when sitting on recumbent bike    Objective            Treatment:        CPT  Intervention  Performed    Today  Duration / Intensity    MT  Soft Tissue Mobilizations: left hamstrings, gastrocnemius and soleus   x      TE          ?          ?                    NMR?  Pelvic Tilts    3x10 reps 3 sec holds       Abdominal Bracing 90/90 holds    2x10 reps      90/90 Heel Taps    3x10 reps     ?  Supine Glutes Squeezes- knees extended    3x10 reps       Recumbent Bike    8 minutes       Eliptical             x  8 minutes      Side Lying Clams  x  3x10 reps red band      Bridges- single leg bridge  x  3x10 reps       Prone Hamstring Curls  x  3x10 reps left 2 lbs      Prone Hip Extension with Knee Flexed  x  3x10 reps B      Seated Internal Rotation- ball between knees, red band around ankles    3x10 reps       Side Lying Hip Adduction  x  3x10 reps left       Side Lying Hip Abduction  x  2x10 reps left     TA  Standing  Hamstring Curls    2x10 reps left     ?  Standing Heel Raises- two up one down left   x  3x10 reps       Single leg calf raise standing       Attempted, but deferred  Patient unable to perform on LLE so deferred      Matrix leg press single leg calf raise             x  3 x 10 bilateral 35lbs      Side stepping             x  4 laps with green band around calves on turf    ?  Soleus Raises from chair    3x10 reps 25 lbs      Standing Dorsiflexion- performed in unison    3x10 reps     PLAN  NuStep   Soft Tissue and Joint Mobilizations   Manual Lumbar Traction   Piriformis Stretch   Glute Sets   Prone Hip Extension with Knee Flexed   Seated Hamstring Curls  ?  ?      ?   ?   CPT Codes available for Billing:    (08) minutes of Manual therapy (MT) to improve pain and ROM.   (00) minutes of Therapeutic Exercise (TE) to develop strength, endurance, range of motion, and flexibility.   (45) minutes of Neuromuscular Re-Education (NMR)? to improve: Balance, Coordination, Kinesthetic, Sense, Proprioception, and Posture.   (12) minutes of Therapeutic Activities (TA) to improve functional performance.   Vasopneumatic Device Therapy () for management of swelling/edema. (59225)   Unattended Electrical Stimulation (ES) for muscle performance or pain modulation.   BFR: Blood flow restriction applied during exercise   ?      Assessment & Plan   Assessment: Was able to incorporate weight for supine hamstring curls and with better fluidity with prone hip extensions. Was able to also incorporate resistance for side lying clams which he has previously been unable to tolerate. Overall better tolerance to activity. Tried to progress to single leg raises but unable to perform against gravity so performed on leg press instead with better range noted. Cueing provided throughout to decrease compensations present.  Evaluation/Treatment Tolerance: Patient tolerated treatment well    Patient will continue to benefit from skilled outpatient  physical therapy to address the deficits listed in the problem list box on initial evaluation, provide pt/family education and to maximize pt's level of independence in the home and community environment.     Patient's spiritual, cultural, and educational needs considered and patient agreeable to plan of care and goals.           Plan: Continue POC and frequency as planned. Adjust/modify treatment based off of previous response to treatment.    Goals:   Active       Long Term Goals       Pt will report worst pain of 2/10 in order to progress toward max functional ability and improve quality of life                Start:  04/16/25    Expected End:  06/11/25            Patient will demonstrate improved function as indicated by a score of greater than or equal to 75 out of 100 on FOTO.                    Start:  04/16/25    Expected End:  06/11/25            Patient will improve strength to at least 4+/5 grossly,  in order to improve functional independence and quality of life.         Start:  04/16/25    Expected End:  06/11/25               Short Term Goals       Pt will report worst pain of 5/10 in order to progress toward max functional ability and improve quality of life                Start:  04/16/25    Expected End:  05/14/25            Patient will improve strength by 1/2 a grade, in order to progress towards independence with functional activities.                Start:  04/16/25    Expected End:  05/14/25            Patient will demonstrate improved function as indicated by a score of greater than or equal to 65 out of 100 on FOTO.                Start:  04/16/25    Expected End:  05/14/25                  Ally Sutton, PT

## 2025-05-16 ENCOUNTER — CLINICAL SUPPORT (OUTPATIENT)
Dept: REHABILITATION | Facility: HOSPITAL | Age: 50
End: 2025-05-16
Payer: COMMERCIAL

## 2025-05-16 DIAGNOSIS — R53.1 DECREASED STRENGTH, ENDURANCE, AND MOBILITY: ICD-10-CM

## 2025-05-16 DIAGNOSIS — R68.89 DECREASED STRENGTH, ENDURANCE, AND MOBILITY: ICD-10-CM

## 2025-05-16 DIAGNOSIS — M54.16 LUMBAR RADICULAR PAIN: Primary | ICD-10-CM

## 2025-05-16 DIAGNOSIS — Z74.09 IMPAIRED MOBILITY AND ADLS: ICD-10-CM

## 2025-05-16 DIAGNOSIS — Z74.09 DECREASED STRENGTH, ENDURANCE, AND MOBILITY: ICD-10-CM

## 2025-05-16 DIAGNOSIS — Z78.9 IMPAIRED MOBILITY AND ADLS: ICD-10-CM

## 2025-05-16 PROCEDURE — 97530 THERAPEUTIC ACTIVITIES: CPT

## 2025-05-16 PROCEDURE — 97140 MANUAL THERAPY 1/> REGIONS: CPT

## 2025-05-16 PROCEDURE — 97112 NEUROMUSCULAR REEDUCATION: CPT

## 2025-05-16 NOTE — PROGRESS NOTES
Outpatient Rehab    Physical Therapy Visit    Patient Name: Christo Banegas  MRN: [id number]  YOB: 1975  Encounter Date: 5/16/2025    Therapy Diagnosis:   Encounter Diagnoses   Name Primary?    Lumbar radicular pain Yes    Decreased strength, endurance, and mobility     Impaired mobility and ADLs          Physician: Jelly Chaney DO    Physician Orders: Eval and Treat  Medical Diagnosis: Left sided sciatica    Visit # / Visits Authorized:  8 / 20  Insurance Authorization Period: 4/14/2025 to 12/31/2025  Date of Evaluation: 4/14/2025  Plan of Care Certification: 4/14/2025 to 6/11/2025     PT/PTA:     Number of PTA visits since last PT visit:   Time In: 1530   Time Out: 1625  Total Time: 55   Total Billable Time:  55    FOTO:  Intake Score:  %  Survey Score 1:  %  Survey Score 2:  %         Subjective   Patient states he feels like he can move his hamstring better but is still struggling with his calf..  Pain reported as 0/10.      Objective            Treatment:        CPT  Intervention  Performed    Today  Duration / Intensity    MT  Soft Tissue Mobilizations: left hamstrings, gastrocnemius and soleus         Cupping x 8 minutes to hamstring and calf left    Functional Dry Needling     TE          ?          ?                    NMR?  Pelvic Tilts    3x10 reps 3 sec holds       Abdominal Bracing 90/90 holds    2x10 reps      90/90 Heel Taps    3x10 reps     ?  Supine Glutes Squeezes- knees extended    3x10 reps       Recumbent Bike    8 minutes       Eliptical             x  8 minutes      Side Lying Clams  x  3x10 reps red band      Bridges- single leg bridge  x  3x10 reps       Prone Hamstring Curls  x  3x10 reps left 3 lbs      Prone Hip Extension with Knee Flexed  x  3x10 reps B      Seated Internal Rotation- ball between knees, red band around ankles    3x10 reps       Side Lying Hip Adduction   3x10 reps left       Side Lying Hip Abduction  x  2x10 reps left     TA  Standing Hamstring Curls     2x10 reps left     ?  Standing Heel Raises- two up one down left   x  2x10 reps       Single leg calf raise standing       Attempted, but deferred  Patient unable to perform on LLE so deferred      Matrix leg press single leg calf raise             - 3 x 10 bilateral 35lbs      Side stepping             x  4 laps with green band around calves on turf    ?  Soleus Raises from chair    3x10 reps 25 lbs      Standing Dorsiflexion- performed in unison    3x10 reps     PLAN  NuStep   Soft Tissue and Joint Mobilizations   Manual Lumbar Traction   Piriformis Stretch   Glute Sets   Prone Hip Extension with Knee Flexed   Seated Hamstring Curls  ?  ?      ?   ?   CPT Codes available for Billing:    (08) minutes of Manual therapy (MT) to improve pain and ROM.   (00) minutes of Therapeutic Exercise (TE) to develop strength, endurance, range of motion, and flexibility.   (38) minutes of Neuromuscular Re-Education (NMR)? to improve: Balance, Coordination, Kinesthetic, Sense, Proprioception, and Posture.   (09) minutes of Therapeutic Activities (TA) to improve functional performance.   Vasopneumatic Device Therapy () for management of swelling/edema. (31158)   Unattended Electrical Stimulation (ES) for muscle performance or pain modulation.   BFR: Blood flow restriction applied during exercise   ?      Assessment & Plan   Assessment: Patient tolerated all treatment well and was able to complete program as noted this treatment session. Cues for all interventions for correct technique and to avoid substitution patterns. Adding cupping today which patient did report muslcuar tension with, but tolerable. Encouraged HEP       Patient will continue to benefit from skilled outpatient physical therapy to address the deficits listed in the problem list box on initial evaluation, provide pt/family education and to maximize pt's level of independence in the home and community environment.     Patient's spiritual, cultural, and educational  needs considered and patient agreeable to plan of care and goals.           Plan: Continue POC and frequency as planned. Adjust/modify treatment based off of previous response to treatment.    Goals:   Active       Long Term Goals       Pt will report worst pain of 2/10 in order to progress toward max functional ability and improve quality of life                Start:  04/16/25    Expected End:  06/11/25            Patient will demonstrate improved function as indicated by a score of greater than or equal to 75 out of 100 on FOTO.                    Start:  04/16/25    Expected End:  06/11/25            Patient will improve strength to at least 4+/5 grossly,  in order to improve functional independence and quality of life.         Start:  04/16/25    Expected End:  06/11/25               Short Term Goals       Pt will report worst pain of 5/10 in order to progress toward max functional ability and improve quality of life                Start:  04/16/25    Expected End:  05/14/25            Patient will improve strength by 1/2 a grade, in order to progress towards independence with functional activities.                Start:  04/16/25    Expected End:  05/14/25            Patient will demonstrate improved function as indicated by a score of greater than or equal to 65 out of 100 on FOTO.                Start:  04/16/25    Expected End:  05/14/25                  Pili Hampton, PT

## 2025-05-20 ENCOUNTER — CLINICAL SUPPORT (OUTPATIENT)
Dept: REHABILITATION | Facility: HOSPITAL | Age: 50
End: 2025-05-20
Payer: COMMERCIAL

## 2025-05-20 DIAGNOSIS — Z78.9 IMPAIRED MOBILITY AND ADLS: ICD-10-CM

## 2025-05-20 DIAGNOSIS — R53.1 DECREASED STRENGTH, ENDURANCE, AND MOBILITY: ICD-10-CM

## 2025-05-20 DIAGNOSIS — M54.16 LUMBAR RADICULAR PAIN: Primary | ICD-10-CM

## 2025-05-20 DIAGNOSIS — R68.89 DECREASED STRENGTH, ENDURANCE, AND MOBILITY: ICD-10-CM

## 2025-05-20 DIAGNOSIS — Z74.09 IMPAIRED MOBILITY AND ADLS: ICD-10-CM

## 2025-05-20 DIAGNOSIS — Z74.09 DECREASED STRENGTH, ENDURANCE, AND MOBILITY: ICD-10-CM

## 2025-05-20 PROCEDURE — 97112 NEUROMUSCULAR REEDUCATION: CPT

## 2025-05-20 PROCEDURE — 97140 MANUAL THERAPY 1/> REGIONS: CPT

## 2025-05-20 PROCEDURE — 97530 THERAPEUTIC ACTIVITIES: CPT

## 2025-05-20 NOTE — PROGRESS NOTES
Outpatient Rehab    Physical Therapy Visit    Patient Name: Christo Banegas  MRN: [id number]  YOB: 1975  Encounter Date: 5/20/2025    Therapy Diagnosis:   Encounter Diagnoses   Name Primary?    Lumbar radicular pain Yes    Decreased strength, endurance, and mobility     Impaired mobility and ADLs          Physician: Jelly Chaney DO    Physician Orders: Eval and Treat  Medical Diagnosis: Left sided sciatica    Visit # / Visits Authorized:  9 / 20  Insurance Authorization Period: 4/14/2025 to 12/31/2025  Date of Evaluation: 4/14/2025  Plan of Care Certification: 4/14/2025 to 6/11/2025     PT/PTA:     Number of PTA visits since last PT visit:   Time In: 1500   Time Out: 1615  Total Time: 75   Total Billable Time: 75    FOTO:  Intake Score:  %  Survey Score 1:  %  Survey Score 2:  %         Subjective   Patient reports that he went to [employer] for lunch and sat at the bar. Patient reports that he had pain at 7/10 for a few seconds..  Pain reported as 0/10. left low back, buttock, lower extremity, 2/10 when sitting on recumbent bike    Objective            Treatment:        CPT  Intervention  Performed    Today  Duration / Intensity    MT  Soft Tissue Mobilizations: left hamstrings, gastrocnemius and soleus   x      Cupping  8 minutes to hamstring and calf left    Functional Dry Needling Next    TE          ?          ?                    NMR?  Pelvic Tilts    3x10 reps 3 sec holds       Abdominal Bracing 90/90 holds    2x10 reps      90/90 Heel Taps    3x10 reps     ?  Supine Glutes Squeezes- knees extended    3x10 reps       Recumbent Bike    8 minutes       Eliptical   x 8 minutes      Side Lying Clams  x  3x10 reps red band      Bridges- single leg bridge  x  3x10 reps       Prone Hamstring Curls  x  3x10 reps left 3 lbs      Prone Hip Extension with Knee Flexed  x  3x10 reps B      Seated Internal Rotation- ball between knees, red band around ankles    3x10 reps       Side Lying Hip Adduction    3x10 reps left       Side Lying Hip Abduction  x  3x10 reps left      Seated Sciatic Nerve Glides x 3x10 reps left   TA  Standing Hamstring Curls    2x10 reps left     ?  Standing Heel Raises- two up one down left   x  x10 reps       Single leg calf raise standing       Attempted, but deferred  Patient unable to perform on LLE so deferred      Matrix single leg calf raise  x 3 x 10 bilateral 35lbs     Matrix Leg Press x 3x10 reps 85 lbs     Standing Hip Hikes from 6 inch step x 3x10 reps, using stick for UE support    Single Leg Balance x 4x30 sec holds left     Side stepping  x  3 laps with green band around calves on turf    ?  Soleus Raises from chair    3x10 reps 25 lbs      Standing Dorsiflexion- performed in unison    3x10 reps     PLAN  NuStep   Soft Tissue and Joint Mobilizations   Manual Lumbar Traction   Piriformis Stretch   Glute Sets   Seated Hamstring Curls  ?  ?      ?   ?   CPT Codes available for Billing:    (08) minutes of Manual therapy (MT) to improve pain and ROM.   (00) minutes of Therapeutic Exercise (TE) to develop strength, endurance, range of motion, and flexibility.   (38) minutes of Neuromuscular Re-Education (NMR)? to improve: Balance, Coordination, Kinesthetic, Sense, Proprioception, and Posture.   (29) minutes of Therapeutic Activities (TA) to improve functional performance.   Vasopneumatic Device Therapy () for management of swelling/edema. (56434)   Unattended Electrical Stimulation (ES) for muscle performance or pain modulation.   BFR: Blood flow restriction applied during exercise   ?      Assessment & Plan   Assessment: Patient with continued improving tolerance to activity. Incorporated single leg balance, hip hikes and leg press to progress strengthening program. Cueing provided throughout to improve form and decrease compensations present. Will try Dry Needling next visit to see if this assist in improving muscle activation and motor control.  Evaluation/Treatment Tolerance:  Patient tolerated treatment well    Patient will continue to benefit from skilled outpatient physical therapy to address the deficits listed in the problem list box on initial evaluation, provide pt/family education and to maximize pt's level of independence in the home and community environment.     Patient's spiritual, cultural, and educational needs considered and patient agreeable to plan of care and goals.           Plan: Continue POC and frequency as planned. Adjust/modify treatment based off of previous response to treatment.    Goals:   Active       Long Term Goals       Pt will report worst pain of 2/10 in order to progress toward max functional ability and improve quality of life                Start:  04/16/25    Expected End:  06/11/25            Patient will demonstrate improved function as indicated by a score of greater than or equal to 75 out of 100 on FOTO.                    Start:  04/16/25    Expected End:  06/11/25            Patient will improve strength to at least 4+/5 grossly,  in order to improve functional independence and quality of life.         Start:  04/16/25    Expected End:  06/11/25               Short Term Goals       Pt will report worst pain of 5/10 in order to progress toward max functional ability and improve quality of life                Start:  04/16/25    Expected End:  05/14/25            Patient will improve strength by 1/2 a grade, in order to progress towards independence with functional activities.                Start:  04/16/25    Expected End:  05/14/25            Patient will demonstrate improved function as indicated by a score of greater than or equal to 65 out of 100 on FOTO.                Start:  04/16/25    Expected End:  05/14/25                  Ally Sutton, PT

## 2025-05-23 ENCOUNTER — CLINICAL SUPPORT (OUTPATIENT)
Dept: REHABILITATION | Facility: HOSPITAL | Age: 50
End: 2025-05-23
Payer: COMMERCIAL

## 2025-05-23 DIAGNOSIS — Z74.09 IMPAIRED MOBILITY AND ADLS: ICD-10-CM

## 2025-05-23 DIAGNOSIS — Z74.09 DECREASED STRENGTH, ENDURANCE, AND MOBILITY: ICD-10-CM

## 2025-05-23 DIAGNOSIS — R53.1 DECREASED STRENGTH, ENDURANCE, AND MOBILITY: ICD-10-CM

## 2025-05-23 DIAGNOSIS — R68.89 DECREASED STRENGTH, ENDURANCE, AND MOBILITY: ICD-10-CM

## 2025-05-23 DIAGNOSIS — Z78.9 IMPAIRED MOBILITY AND ADLS: ICD-10-CM

## 2025-05-23 DIAGNOSIS — M54.16 LUMBAR RADICULAR PAIN: Primary | ICD-10-CM

## 2025-05-23 PROCEDURE — 97112 NEUROMUSCULAR REEDUCATION: CPT

## 2025-05-23 PROCEDURE — 97140 MANUAL THERAPY 1/> REGIONS: CPT

## 2025-05-23 PROCEDURE — 97530 THERAPEUTIC ACTIVITIES: CPT

## 2025-05-26 NOTE — PROGRESS NOTES
Outpatient Rehab    Physical Therapy Visit    Patient Name: Christo Banegas  MRN: [id number]  YOB: 1975  Encounter Date: 5/23/2025    Therapy Diagnosis:   Encounter Diagnoses   Name Primary?    Lumbar radicular pain Yes    Decreased strength, endurance, and mobility     Impaired mobility and ADLs          Physician: Jelly Chaney DO    Physician Orders: Eval and Treat  Medical Diagnosis: Left sided sciatica    Visit # / Visits Authorized:  10 / 20  Insurance Authorization Period: 4/14/2025 to 12/31/2025  Date of Evaluation: 4/14/2025  Plan of Care Certification: 4/14/2025 to 6/11/2025     PT/PTA:     Number of PTA visits since last PT visit:   Time In: 1228   Time Out: 1335  Total Time: 67   Total Billable Time: 67    FOTO:  Intake Score:  %  Survey Score 1:  %  Survey Score 2:  %         Subjective   Patient reports that he is feeling okay today. Kind of feels like he is getting stronger. Would like to try Dry Needling today to see if it helps improve his strength..  Pain reported as 0/10. left low back, buttock, lower extremity, 2/10 when sitting on recumbent bike    Objective            Treatment:        CPT  Intervention  Performed    Today  Duration / Intensity    MT  Soft Tissue Mobilizations: left hamstrings, gastrocnemius and soleus   x      Cupping  8 minutes to hamstring and calf left    Functional Dry Needling x Verbal and written consent provided from patient     Performed to left lateral gastrocnemius and hamstrings with electrical stimulation    TE          ?          ?                    NMR?  Pelvic Tilts    3x10 reps 3 sec holds       Abdominal Bracing 90/90 holds    2x10 reps      90/90 Heel Taps    3x10 reps     ?  Supine Glutes Squeezes- knees extended    3x10 reps       Recumbent Bike    8 minutes       Eliptical   x 8 minutes      Side Lying Clams  x  3x10 reps red band      Bridges- single leg bridge  x  3x10 reps      Bridges from Ball with Hamstring Curls   x 2x10 reps       Prone Hamstring Curls  x  3x10 reps left 5 lbs      Prone Hip Extension with Knee Flexed  x  3x10 reps B      Seated Internal Rotation- ball between knees, red band around ankles    3x10 reps       Side Lying Hip Adduction   3x10 reps left       Side Lying Hip Abduction  x  3x10 reps left      Seated Sciatic Nerve Glides  3x10 reps left   TA  Standing Hamstring Curls    2x10 reps left     ?  Standing Heel Raises- two up one down left   x  3x10 reps       Single leg calf raise standing       Attempted, but deferred  Patient unable to perform on LLE so deferred      Matrix single leg calf raise  x 3 x 10 bilateral 35lbs     Matrix Leg Press x 3x10 reps 85 lbs           Standing Hip Hikes from 6 inch step  3x10 reps, using stick for UE support    Single Leg Balance x 4x30 sec holds left     Side stepping  x  3 laps with green band around calves on turf    ?  Soleus Raises from chair    3x10 reps 25 lbs      Standing Dorsiflexion- performed in unison    3x10 reps     PLAN  NuStep   Soft Tissue and Joint Mobilizations   Manual Lumbar Traction   Piriformis Stretch   Glute Sets   Seated Hamstring Curls  ?  ?      ?   ?   CPT Codes available for Billing:    (12) minutes of Manual therapy (MT) to improve pain and ROM.   (00) minutes of Therapeutic Exercise (TE) to develop strength, endurance, range of motion, and flexibility.   (32) minutes of Neuromuscular Re-Education (NMR)? to improve: Balance, Coordination, Kinesthetic, Sense, Proprioception, and Posture.   (23) minutes of Therapeutic Activities (TA) to improve functional performance.   Vasopneumatic Device Therapy () for management of swelling/edema. (48869)   Unattended Electrical Stimulation (ES) for muscle performance or pain modulation.   BFR: Blood flow restriction applied during exercise   ?      Assessment & Plan   Assessment: Patient with good muscle twitch response to Functional Dry Needling with no adverse reactions. Patient noted with improvements in muscle  tension along the lateral aspect of the leg with improvements in strength and less overall muscle fatigue with previous exercises, able to progress with additional weight and with higher level exercise. Will try needling medial aspect next visit and see if we get continued relief of tension and improved muscle control.  Evaluation/Treatment Tolerance: Patient tolerated treatment well    Patient will continue to benefit from skilled outpatient physical therapy to address the deficits listed in the problem list box on initial evaluation, provide pt/family education and to maximize pt's level of independence in the home and community environment.     Patient's spiritual, cultural, and educational needs considered and patient agreeable to plan of care and goals.           Plan: Continue POC and frequency as planned. Adjust/modify treatment based off of previous response to treatment.    Goals:   Active       Long Term Goals       Pt will report worst pain of 2/10 in order to progress toward max functional ability and improve quality of life                Start:  04/16/25    Expected End:  06/11/25            Patient will demonstrate improved function as indicated by a score of greater than or equal to 75 out of 100 on FOTO.                    Start:  04/16/25    Expected End:  06/11/25            Patient will improve strength to at least 4+/5 grossly,  in order to improve functional independence and quality of life.         Start:  04/16/25    Expected End:  06/11/25               Short Term Goals       Pt will report worst pain of 5/10 in order to progress toward max functional ability and improve quality of life                Start:  04/16/25    Expected End:  05/14/25            Patient will improve strength by 1/2 a grade, in order to progress towards independence with functional activities.                Start:  04/16/25    Expected End:  05/14/25            Patient will demonstrate improved function as  indicated by a score of greater than or equal to 65 out of 100 on FOTO.                Start:  04/16/25    Expected End:  05/14/25                  Ally Sutton, PT

## 2025-05-30 ENCOUNTER — CLINICAL SUPPORT (OUTPATIENT)
Dept: REHABILITATION | Facility: HOSPITAL | Age: 50
End: 2025-05-30
Payer: COMMERCIAL

## 2025-05-30 DIAGNOSIS — R53.1 DECREASED STRENGTH, ENDURANCE, AND MOBILITY: ICD-10-CM

## 2025-05-30 DIAGNOSIS — M54.16 LUMBAR RADICULAR PAIN: Primary | ICD-10-CM

## 2025-05-30 DIAGNOSIS — R68.89 DECREASED STRENGTH, ENDURANCE, AND MOBILITY: ICD-10-CM

## 2025-05-30 DIAGNOSIS — Z78.9 IMPAIRED MOBILITY AND ADLS: ICD-10-CM

## 2025-05-30 DIAGNOSIS — Z74.09 IMPAIRED MOBILITY AND ADLS: ICD-10-CM

## 2025-05-30 DIAGNOSIS — Z74.09 DECREASED STRENGTH, ENDURANCE, AND MOBILITY: ICD-10-CM

## 2025-05-30 PROCEDURE — 97112 NEUROMUSCULAR REEDUCATION: CPT

## 2025-05-30 PROCEDURE — 97530 THERAPEUTIC ACTIVITIES: CPT

## 2025-05-30 PROCEDURE — 97140 MANUAL THERAPY 1/> REGIONS: CPT

## 2025-05-30 NOTE — PROGRESS NOTES
Outpatient Rehab    Physical Therapy Visit    Patient Name: Christo Banegas  MRN: [id number]  YOB: 1975  Encounter Date: 5/30/2025    Therapy Diagnosis:   Encounter Diagnoses   Name Primary?    Lumbar radicular pain Yes    Decreased strength, endurance, and mobility     Impaired mobility and ADLs          Physician: Jelly Chaney DO    Physician Orders: Eval and Treat  Medical Diagnosis: Left sided sciatica    Visit # / Visits Authorized:  11 / 20  Insurance Authorization Period: 4/14/2025 to 12/31/2025  Date of Evaluation: 4/14/2025  Plan of Care Certification: 4/14/2025 to 6/11/2025     PT/PTA:     Number of PTA visits since last PT visit:   Time In: 1230   Time Out: 1335  Total Time: 65   Total Billable Time: 65    FOTO:  Intake Score:  %  Survey Score 1:  %  Survey Score 2:  %         Subjective   Patient reports that he might have seen changes following Dry Needling last visit but does not have the energy for it today. Reports that he is exhausted. Only had one instance of cramping in the last week. Did not do exercises as much this week..  Pain reported as 0/10. left low back, buttock, lower extremity, 2/10 when sitting on recumbent bike    Objective            Treatment:        CPT  Intervention  Performed    Today  Duration / Intensity    MT  Soft Tissue Mobilizations: left medial hamstrings, gastrocnemius  x      Cupping  8 minutes to hamstring and calf left    Functional Dry Needling  Verbal and written consent provided from patient     Performed to left lateral gastrocnemius and hamstrings with electrical stimulation    TE          ?          ?                    NMR?  Pelvic Tilts    3x10 reps 3 sec holds       Abdominal Bracing 90/90 holds    2x10 reps      90/90 Heel Taps    3x10 reps     ?  Supine Glutes Squeezes- knees extended    3x10 reps       Recumbent Bike    8 minutes       Eliptical   x 8 minutes      Side Lying Clams  x  3x10 reps red band      Bridges- single leg bridge  x   3x10 reps      Bridges from Ball with Hamstring Curls   x 2x10 reps      Prone Hamstring Curls  x  3x10 reps left 5 lbs      Prone Hip Extension with Knee Flexed  x  3x10 reps B      Seated Internal Rotation- ball between knees, red band around ankles    3x10 reps       Side Lying Hip Adduction   3x10 reps left       Side Lying Hip Abduction  x  3x10 reps left      Seated Sciatic Nerve Glides  3x10 reps left   TA  Standing Hamstring Curls    2x10 reps left     ?  Standing Heel Raises- two up one down left   x  3x10 reps       Single leg calf raise standing       Attempted, but deferred  Patient unable to perform on LLE so deferred      Matrix single leg calf raise  x 3 x 10 bilateral 35lbs     Matrix Leg Press x 3x10 reps 85 lbs           Standing Hip Hikes from 6 inch step  3x10 reps, using stick for UE support    Single Leg Balance x 4x30 sec holds left     Side stepping  x  3 laps with green band around calves on turf    ?  Soleus Raises from chair    3x10 reps 25 lbs      Standing Dorsiflexion- performed in unison    3x10 reps     PLAN  NuStep   Soft Tissue and Joint Mobilizations   Manual Lumbar Traction   Piriformis Stretch   Glute Sets   Seated Hamstring Curls  ?  ?      ?   ?   CPT Codes available for Billing:    (08) minutes of Manual therapy (MT) to improve pain and ROM.   (00) minutes of Therapeutic Exercise (TE) to develop strength, endurance, range of motion, and flexibility.   (34) minutes of Neuromuscular Re-Education (NMR)? to improve: Balance, Coordination, Kinesthetic, Sense, Proprioception, and Posture.   (23) minutes of Therapeutic Activities (TA) to improve functional performance.   Vasopneumatic Device Therapy () for management of swelling/edema. (16570)   Unattended Electrical Stimulation (ES) for muscle performance or pain modulation.   BFR: Blood flow restriction applied during exercise   ?      Assessment & Plan   Assessment: Patient with more overall muscle fatigue with most exercises  performed today likely due to less activity this week. Patient felt so fatigued after hip hikes that he felt like he could hardly walk. Deferred Dry Needling today but worked through manual therapy along hamstring and gastrocnemius medially with significant soreness in gastroc today.  Evaluation/Treatment Tolerance: Patient tolerated treatment well    Patient will continue to benefit from skilled outpatient physical therapy to address the deficits listed in the problem list box on initial evaluation, provide pt/family education and to maximize pt's level of independence in the home and community environment.     Patient's spiritual, cultural, and educational needs considered and patient agreeable to plan of care and goals.           Plan: Continue POC and frequency as planned. Adjust/modify treatment based off of previous response to treatment.    Goals:   Active       Long Term Goals       Pt will report worst pain of 2/10 in order to progress toward max functional ability and improve quality of life                Start:  04/16/25    Expected End:  06/11/25            Patient will demonstrate improved function as indicated by a score of greater than or equal to 75 out of 100 on FOTO.                    Start:  04/16/25    Expected End:  06/11/25            Patient will improve strength to at least 4+/5 grossly,  in order to improve functional independence and quality of life.         Start:  04/16/25    Expected End:  06/11/25               Short Term Goals       Pt will report worst pain of 5/10 in order to progress toward max functional ability and improve quality of life                Start:  04/16/25    Expected End:  05/14/25            Patient will improve strength by 1/2 a grade, in order to progress towards independence with functional activities.                Start:  04/16/25    Expected End:  05/14/25            Patient will demonstrate improved function as indicated by a score of greater than or  equal to 65 out of 100 on FOTO.                Start:  04/16/25    Expected End:  05/14/25                  Ally Sutton, PT

## 2025-06-04 ENCOUNTER — CLINICAL SUPPORT (OUTPATIENT)
Dept: REHABILITATION | Facility: HOSPITAL | Age: 50
End: 2025-06-04
Payer: COMMERCIAL

## 2025-06-04 DIAGNOSIS — Z74.09 IMPAIRED MOBILITY AND ADLS: ICD-10-CM

## 2025-06-04 DIAGNOSIS — Z78.9 IMPAIRED MOBILITY AND ADLS: ICD-10-CM

## 2025-06-04 DIAGNOSIS — R53.1 DECREASED STRENGTH, ENDURANCE, AND MOBILITY: ICD-10-CM

## 2025-06-04 DIAGNOSIS — Z74.09 DECREASED STRENGTH, ENDURANCE, AND MOBILITY: ICD-10-CM

## 2025-06-04 DIAGNOSIS — M54.16 LUMBAR RADICULAR PAIN: Primary | ICD-10-CM

## 2025-06-04 DIAGNOSIS — R68.89 DECREASED STRENGTH, ENDURANCE, AND MOBILITY: ICD-10-CM

## 2025-06-04 PROCEDURE — 97112 NEUROMUSCULAR REEDUCATION: CPT

## 2025-06-04 PROCEDURE — 97530 THERAPEUTIC ACTIVITIES: CPT

## 2025-06-04 PROCEDURE — 97140 MANUAL THERAPY 1/> REGIONS: CPT

## 2025-06-06 ENCOUNTER — OFFICE VISIT (OUTPATIENT)
Dept: INTERNAL MEDICINE | Facility: CLINIC | Age: 50
End: 2025-06-06
Payer: COMMERCIAL

## 2025-06-06 ENCOUNTER — CLINICAL SUPPORT (OUTPATIENT)
Dept: REHABILITATION | Facility: HOSPITAL | Age: 50
End: 2025-06-06
Payer: COMMERCIAL

## 2025-06-06 VITALS
SYSTOLIC BLOOD PRESSURE: 114 MMHG | BODY MASS INDEX: 28.46 KG/M2 | WEIGHT: 210.13 LBS | OXYGEN SATURATION: 98 % | HEART RATE: 102 BPM | TEMPERATURE: 97 F | HEIGHT: 72 IN | RESPIRATION RATE: 18 BRPM | DIASTOLIC BLOOD PRESSURE: 80 MMHG

## 2025-06-06 DIAGNOSIS — Z74.09 DECREASED STRENGTH, ENDURANCE, AND MOBILITY: ICD-10-CM

## 2025-06-06 DIAGNOSIS — Z86.711 HISTORY OF PULMONARY EMBOLISM: ICD-10-CM

## 2025-06-06 DIAGNOSIS — Z00.00 ROUTINE GENERAL MEDICAL EXAMINATION AT A HEALTH CARE FACILITY: Primary | ICD-10-CM

## 2025-06-06 DIAGNOSIS — R53.1 DECREASED STRENGTH, ENDURANCE, AND MOBILITY: ICD-10-CM

## 2025-06-06 DIAGNOSIS — R93.1 ELEVATED CORONARY ARTERY CALCIUM SCORE: ICD-10-CM

## 2025-06-06 DIAGNOSIS — Z78.9 IMPAIRED MOBILITY AND ADLS: ICD-10-CM

## 2025-06-06 DIAGNOSIS — Z74.09 IMPAIRED MOBILITY AND ADLS: ICD-10-CM

## 2025-06-06 DIAGNOSIS — I10 PRIMARY HYPERTENSION: ICD-10-CM

## 2025-06-06 DIAGNOSIS — M54.16 LUMBAR RADICULAR PAIN: Primary | ICD-10-CM

## 2025-06-06 DIAGNOSIS — R68.89 DECREASED STRENGTH, ENDURANCE, AND MOBILITY: ICD-10-CM

## 2025-06-06 DIAGNOSIS — I10 ESSENTIAL HYPERTENSION: ICD-10-CM

## 2025-06-06 PROCEDURE — 3074F SYST BP LT 130 MM HG: CPT | Mod: CPTII,S$GLB,, | Performed by: INTERNAL MEDICINE

## 2025-06-06 PROCEDURE — 99396 PREV VISIT EST AGE 40-64: CPT | Mod: S$GLB,,, | Performed by: INTERNAL MEDICINE

## 2025-06-06 PROCEDURE — 99999 PR PBB SHADOW E&M-EST. PATIENT-LVL III: CPT | Mod: PBBFAC,,, | Performed by: INTERNAL MEDICINE

## 2025-06-06 PROCEDURE — 3044F HG A1C LEVEL LT 7.0%: CPT | Mod: CPTII,S$GLB,, | Performed by: INTERNAL MEDICINE

## 2025-06-06 PROCEDURE — 1159F MED LIST DOCD IN RCRD: CPT | Mod: CPTII,S$GLB,, | Performed by: INTERNAL MEDICINE

## 2025-06-06 PROCEDURE — 97530 THERAPEUTIC ACTIVITIES: CPT

## 2025-06-06 PROCEDURE — 3079F DIAST BP 80-89 MM HG: CPT | Mod: CPTII,S$GLB,, | Performed by: INTERNAL MEDICINE

## 2025-06-06 PROCEDURE — 97112 NEUROMUSCULAR REEDUCATION: CPT

## 2025-06-06 PROCEDURE — 4010F ACE/ARB THERAPY RXD/TAKEN: CPT | Mod: CPTII,S$GLB,, | Performed by: INTERNAL MEDICINE

## 2025-06-06 PROCEDURE — 3008F BODY MASS INDEX DOCD: CPT | Mod: CPTII,S$GLB,, | Performed by: INTERNAL MEDICINE

## 2025-06-06 RX ORDER — ROSUVASTATIN CALCIUM 20 MG/1
20 TABLET, COATED ORAL NIGHTLY
Qty: 90 TABLET | Refills: 3 | Status: SHIPPED | OUTPATIENT
Start: 2025-06-06

## 2025-06-06 RX ORDER — LISINOPRIL 10 MG/1
10 TABLET ORAL DAILY
Qty: 90 TABLET | Refills: 3 | Status: SHIPPED | OUTPATIENT
Start: 2025-06-06 | End: 2026-06-06

## 2025-06-06 NOTE — PROGRESS NOTES
Subjective:      Patient ID: Rafael Banegas is a 49 y.o. male.    Chief Complaint: Annual Exam    HPI  History of Present Illness                 49 y.o. with  Problem List[1]  Past Medical History:   Diagnosis Date    Cervical radiculitis 3/29/2022    Elevated coronary artery calcium score 12/23/2022    Hypertension        Here today for annual prev exam.  Compliant with meds without significant side effects. Energy and appetite are good.         Past Surgical History:   Procedure Laterality Date    COLONOSCOPY N/A 06/24/2022    Procedure: COLONOSCOPY;  Surgeon: Erica Mensah MD;  Location: [facility];  Service: Endoscopy;  Laterality: N/A;    LUMBAR LAMINECTOMY  2017     Social History[2]  family history includes Arthritis in his maternal grandmother; Cataracts in his maternal grandmother; Congenital heart disease in his maternal grandfather; Dementia in his father; Depression in his brother and mother; Diabetes in his paternal grandmother; Glaucoma in his paternal grandmother; Heart block in his father; Heart disease in his father; Hypertension in his mother; Stroke in his father.  Review of Systems   Constitutional:  Negative for chills and fever.   HENT:  Negative for ear pain and sore throat.    Respiratory:  Negative for cough.    Cardiovascular:  Negative for chest pain.   Gastrointestinal:  Negative for abdominal pain and blood in stool.   Genitourinary:  Negative for dysuria and hematuria.   Neurological:  Negative for seizures and syncope.     Objective:   /80 (BP Location: Left arm, Patient Position: Sitting)   Pulse 102   Temp 96.6 °F (35.9 °C) (Tympanic)   Resp 18   Ht 6' (1.829 m)   Wt 95.3 kg (210 lb 1.6 oz)   SpO2 98%   BMI 28.49 kg/m²     Physical Exam  Constitutional:       General: He is not in acute distress.     Appearance: He is well-developed.   HENT:      Head: Normocephalic and atraumatic.   Eyes:      Extraocular Movements: Extraocular movements intact.   Neck:       Thyroid: No thyromegaly.   Cardiovascular:      Rate and Rhythm: Normal rate and regular rhythm.   Pulmonary:      Breath sounds: Normal breath sounds. No wheezing or rales.   Abdominal:      General: Bowel sounds are normal.      Palpations: Abdomen is soft.      Tenderness: There is no abdominal tenderness.   Musculoskeletal:         General: No swelling.      Cervical back: Neck supple. No rigidity.   Lymphadenopathy:      Cervical: No cervical adenopathy.   Skin:     General: Skin is warm and dry.   Neurological:      Mental Status: He is alert and oriented to person, place, and time.   Psychiatric:         Behavior: Behavior normal.         Lab Results   Component Value Date    WBC 5.36 06/07/2025    HGB 14.3 06/07/2025    HGB 14.5 04/08/2025    HGB 13.9 (L) 06/08/2024    HCT 43.8 06/07/2025    MCV 91 06/07/2025    MCV 92 04/08/2025    MCV 92 06/08/2024     06/07/2025    CHOL 204 (H) 06/07/2025    TRIG 182 (H) 06/07/2025    HDL 47 06/07/2025    LDLCALC 120.6 06/07/2025    LDLCALC 119.2 06/08/2024    LDLCALC 140.4 06/20/2023    ALT 25 06/07/2025    AST 29 06/07/2025     06/07/2025    K 4.7 06/07/2025    CALCIUM 9.4 06/07/2025     06/07/2025    CO2 27 06/07/2025    BUN 11 06/07/2025    CREATININE 0.9 06/07/2025    CREATININE 0.9 06/08/2024    CREATININE 0.8 03/16/2023    EGFRNORACEVR >60 06/07/2025    EGFRNORACEVR >60.0 06/08/2024    EGFRNORACEVR >60.0 03/16/2023    TSH 1.226 06/07/2025    TSH 1.050 06/08/2024    TSH 1.205 03/16/2023    PSA 2.47 06/07/2025    PSA 0.95 06/08/2024    PSA 1.0 03/16/2023    GLU 83 06/07/2025    HGBA1C 5.5 06/07/2025    HGBA1C 5.4 06/08/2024    HGBA1C 5.6 03/28/2022          The 10-year ASCVD risk score (Bev AGUILAR, et al., 2019) is: 7.5%    Values used to calculate the score:      Age: 49 years      Sex: Male      Is Non- : No      Diabetic: No      Tobacco smoker: Yes      Systolic Blood Pressure: 114 mmHg      Is BP treated: Yes      HDL  Cholesterol: 47 mg/dL      Total Cholesterol: 204 mg/dL     Assessment:     1. Routine general medical examination at a health care facility    2. Primary hypertension    3. Elevated coronary artery calcium score    4. History of pulmonary embolism    5. Essential hypertension      Plan:   1. Routine general medical examination at a health care facility  Overview:  Heart healthy diet, regular exercise, and regular use of sunscreen.    reviewed    Orders:  -     Comprehensive Metabolic Panel; Future; Expected date: 06/06/2025  -     CBC Auto Differential; Future; Expected date: 06/06/2025  -     TSH; Future; Expected date: 06/06/2025  -     Lipid Panel; Future; Expected date: 06/06/2025  -     Hemoglobin A1C; Future; Expected date: 06/06/2025  -     PSA, Screening; Future; Expected date: 06/06/2025    2. Primary hypertension  Overview:  Controlled.  Continue current medications      3. Elevated coronary artery calcium score  Overview:  66 2022, statin initiated 3/2023    Orders:  -     rosuvastatin (CRESTOR) 20 MG tablet; Take 1 tablet (20 mg total) by mouth every evening.  Dispense: 90 tablet; Refill: 3    4. History of pulmonary embolism  Overview:  Surrounding laminectomy 2016. Reports neg genetic testing. Pulmonologist advised ok to stop anticoag.       5. Essential hypertension  -     lisinopriL 10 MG tablet; Take 1 tablet (10 mg total) by mouth once daily.  Dispense: 90 tablet; Refill: 3        There are no Patient Instructions on file for this visit.    Future Appointments   Date Time Provider Department Center   6/23/2025  4:30 PM Ally Sutton PT [facility] [place]   6/12/2026  1:40 PM [facility] [place]           Follow up in about 1 year (around 6/6/2026), or if symptoms worsen or fail to improve, for fasting labs saturday..                  [1]   Patient Active Problem List  Diagnosis    Primary hypertension    Environmental allergies    History of pulmonary embolism     History of laminectomy    Encounter for colorectal cancer screening    Elevated coronary artery calcium score    Routine general medical examination at a health care facility    Lumbar radicular pain    Decreased strength, endurance, and mobility    Impaired mobility and ADLs   [2]   Social History  Socioeconomic History    Marital status: Single   Tobacco Use    Smoking status: Some Days     Types: Cigars    Smokeless tobacco: Never   Substance and Sexual Activity    Alcohol use: Yes     Alcohol/week: 3.0 standard drinks of alcohol     Types: 1 Glasses of wine, 2 Cans of beer per week    Drug use: Never    Sexual activity: Yes     Partners: Male     Social Drivers of Health     Financial Resource Strain: Low Risk  (4/4/2025)    Overall Financial Resource Strain (CARDIA)     Difficulty of Paying Living Expenses: Not hard at all   Food Insecurity: No Food Insecurity (4/4/2025)    Hunger Vital Sign     Worried About Running Out of Food in the Last Year: Never true     Ran Out of Food in the Last Year: Never true   Transportation Needs: No Transportation Needs (4/4/2025)    PRAPARE - Transportation     Lack of Transportation (Medical): No     Lack of Transportation (Non-Medical): No   Physical Activity: Sufficiently Active (4/4/2025)    Exercise Vital Sign     Days of Exercise per Week: 4 days     Minutes of Exercise per Session: 90 min   Stress: No Stress Concern Present (4/4/2025)    [employer] [place] [employer] - Occupational Stress Questionnaire     Feeling of Stress : Not at all   Housing Stability: Low Risk  (4/4/2025)    Housing Stability Vital Sign     Unable to Pay for Housing in the Last Year: No     Number of Times Moved in the Last Year: 0     Homeless in the Last Year: No

## 2025-06-07 ENCOUNTER — LAB VISIT (OUTPATIENT)
Dept: LAB | Facility: HOSPITAL | Age: 50
End: 2025-06-07
Attending: INTERNAL MEDICINE
Payer: COMMERCIAL

## 2025-06-07 DIAGNOSIS — Z00.00 ROUTINE GENERAL MEDICAL EXAMINATION AT A HEALTH CARE FACILITY: ICD-10-CM

## 2025-06-07 LAB
ABSOLUTE EOSINOPHIL (OHS): 0.23 K/UL
ABSOLUTE MONOCYTE (OHS): 0.49 K/UL (ref 0.3–1)
ABSOLUTE NEUTROPHIL COUNT (OHS): 2.78 K/UL (ref 1.8–7.7)
ALBUMIN SERPL BCP-MCNC: 4.4 G/DL (ref 3.5–5.2)
ALP SERPL-CCNC: 54 UNIT/L (ref 40–150)
ALT SERPL W/O P-5'-P-CCNC: 25 UNIT/L (ref 10–44)
ANION GAP (OHS): 7 MMOL/L (ref 8–16)
AST SERPL-CCNC: 29 UNIT/L (ref 11–45)
BASOPHILS # BLD AUTO: 0.04 K/UL
BASOPHILS NFR BLD AUTO: 0.7 %
BILIRUB SERPL-MCNC: 0.5 MG/DL (ref 0.1–1)
BUN SERPL-MCNC: 11 MG/DL (ref 6–20)
CALCIUM SERPL-MCNC: 9.4 MG/DL (ref 8.7–10.5)
CHLORIDE SERPL-SCNC: 105 MMOL/L (ref 95–110)
CHOLEST SERPL-MCNC: 204 MG/DL (ref 120–199)
CHOLEST/HDLC SERPL: 4.3 {RATIO} (ref 2–5)
CO2 SERPL-SCNC: 27 MMOL/L (ref 23–29)
CREAT SERPL-MCNC: 0.9 MG/DL (ref 0.5–1.4)
EAG (OHS): 111 MG/DL (ref 68–131)
ERYTHROCYTE [DISTWIDTH] IN BLOOD BY AUTOMATED COUNT: 13.2 % (ref 11.5–14.5)
GFR SERPLBLD CREATININE-BSD FMLA CKD-EPI: >60 ML/MIN/1.73/M2
GLUCOSE SERPL-MCNC: 83 MG/DL (ref 70–110)
HBA1C MFR BLD: 5.5 % (ref 4–5.6)
HCT VFR BLD AUTO: 43.8 % (ref 40–54)
HDLC SERPL-MCNC: 47 MG/DL (ref 40–75)
HDLC SERPL: 23 % (ref 20–50)
HGB BLD-MCNC: 14.3 GM/DL (ref 14–18)
IMM GRANULOCYTES # BLD AUTO: 0.02 K/UL (ref 0–0.04)
IMM GRANULOCYTES NFR BLD AUTO: 0.4 % (ref 0–0.5)
LDLC SERPL CALC-MCNC: 120.6 MG/DL (ref 63–159)
LYMPHOCYTES # BLD AUTO: 1.8 K/UL (ref 1–4.8)
MCH RBC QN AUTO: 29.7 PG (ref 27–31)
MCHC RBC AUTO-ENTMCNC: 32.6 G/DL (ref 32–36)
MCV RBC AUTO: 91 FL (ref 82–98)
NONHDLC SERPL-MCNC: 157 MG/DL
NUCLEATED RBC (/100WBC) (OHS): 0 /100 WBC
PLATELET # BLD AUTO: 306 K/UL (ref 150–450)
PMV BLD AUTO: 11.1 FL (ref 9.2–12.9)
POTASSIUM SERPL-SCNC: 4.7 MMOL/L (ref 3.5–5.1)
PROT SERPL-MCNC: 7.6 GM/DL (ref 6–8.4)
PSA SERPL-MCNC: 2.47 NG/ML
RBC # BLD AUTO: 4.82 M/UL (ref 4.6–6.2)
RELATIVE EOSINOPHIL (OHS): 4.3 %
RELATIVE LYMPHOCYTE (OHS): 33.6 % (ref 18–48)
RELATIVE MONOCYTE (OHS): 9.1 % (ref 4–15)
RELATIVE NEUTROPHIL (OHS): 51.9 % (ref 38–73)
SODIUM SERPL-SCNC: 139 MMOL/L (ref 136–145)
TRIGL SERPL-MCNC: 182 MG/DL (ref 30–150)
TSH SERPL-ACNC: 1.23 UIU/ML (ref 0.4–4)
WBC # BLD AUTO: 5.36 K/UL (ref 3.9–12.7)

## 2025-06-07 PROCEDURE — 85025 COMPLETE CBC W/AUTO DIFF WBC: CPT

## 2025-06-07 PROCEDURE — 84443 ASSAY THYROID STIM HORMONE: CPT

## 2025-06-07 PROCEDURE — 80053 COMPREHEN METABOLIC PANEL: CPT

## 2025-06-07 PROCEDURE — 84153 ASSAY OF PSA TOTAL: CPT

## 2025-06-07 PROCEDURE — 80061 LIPID PANEL: CPT

## 2025-06-07 PROCEDURE — 83036 HEMOGLOBIN GLYCOSYLATED A1C: CPT

## 2025-06-07 PROCEDURE — 36415 COLL VENOUS BLD VENIPUNCTURE: CPT

## 2025-06-09 ENCOUNTER — CLINICAL SUPPORT (OUTPATIENT)
Dept: REHABILITATION | Facility: HOSPITAL | Age: 50
End: 2025-06-09
Payer: COMMERCIAL

## 2025-06-09 DIAGNOSIS — Z78.9 IMPAIRED MOBILITY AND ADLS: ICD-10-CM

## 2025-06-09 DIAGNOSIS — M54.16 LUMBAR RADICULAR PAIN: Primary | ICD-10-CM

## 2025-06-09 DIAGNOSIS — R53.1 DECREASED STRENGTH, ENDURANCE, AND MOBILITY: ICD-10-CM

## 2025-06-09 DIAGNOSIS — Z74.09 DECREASED STRENGTH, ENDURANCE, AND MOBILITY: ICD-10-CM

## 2025-06-09 DIAGNOSIS — R68.89 DECREASED STRENGTH, ENDURANCE, AND MOBILITY: ICD-10-CM

## 2025-06-09 DIAGNOSIS — Z74.09 IMPAIRED MOBILITY AND ADLS: ICD-10-CM

## 2025-06-09 PROCEDURE — 97110 THERAPEUTIC EXERCISES: CPT

## 2025-06-09 PROCEDURE — 97530 THERAPEUTIC ACTIVITIES: CPT

## 2025-06-09 PROCEDURE — 97140 MANUAL THERAPY 1/> REGIONS: CPT

## 2025-06-09 NOTE — PROGRESS NOTES
Outpatient Rehab    Physical Therapy Progress Note : Updated Plan of Care    Patient Name: Christo Banegas  MRN: [id number]  YOB: 1975  Encounter Date: 6/9/2025    Therapy Diagnosis:   Encounter Diagnoses   Name Primary?    Lumbar radicular pain Yes    Decreased strength, endurance, and mobility     Impaired mobility and ADLs        Physician: Jelly Chaney DO    Physician Orders: Eval and Treat  Medical Diagnosis: Left sided sciatica    Visit # / Visits Authorized:  14 / 20  Insurance Authorization Period: 4/14/2025 to 12/31/2025  Date of Evaluation: 4/14/2025  Plan of Care Certification: 6/9/2025 to 8/5/2025     PT/PTA:     Number of PTA visits since last PT visit:   Time In: 1630   Time Out: 1730  Total Time: 60   Total Billable Time: 60    FOTO:  Intake Score:  %  Survey Score 1:  %  Survey Score 2:  %         Subjective   Patient reports that he is seeing slow progress. Patient reports that the pain is slowly beginning to subside with certain activities..  Pain reported as 0/10. left low back, buttock, lower extremity, 2/10 when sitting    Objective              STRENGTH: (* denotes pain)  L/E MMT Right  (spine) Left Dysfunction with Movement Goal   Modified (90/90) Abdominal Strength  75 degrees --- Not tested today 25 degrees   Hip Flexion  9.1 kg 9.0 kg  4+/5 B   Hip Extension  11.9 kg 10.9 kg  4+/5 B   Hip Abduction  9.0 kg 8.3 kg   4+/5 B   Knee Extension 17.9 kg 14.9 kg  5/5 B   Knee Flexion 13.5 kg 10.8 kg  5/5 B   Hip IR 10.3 kg 10.1 kg   4+/5 B   Hip ER 11.5 kg 9.1 kg    4+/5 B   Ankle DF 11.1 kg 8.9 kg  5/5 B   Ankle PF 14.0 kg 11.0 kg  5/5 B       Treatment:        CPT  Intervention  Performed    Today  Duration / Intensity    MT  Soft Tissue Mobilizations: left gastrocnemius  x      Cupping  8 minutes to hamstring and calf left    Functional Dry Needling  Verbal and written consent provided from patient     Performed to left medial gastrocnemius with electrical stimulation   TE   Re-Assessed Objective Measurements x     ?          ?                    NMR?  Pelvic Tilts    3x10 reps 3 sec holds       Abdominal Bracing 90/90 holds    2x10 reps      90/90 Heel Taps    3x10 reps     ?  Supine Glutes Squeezes- knees extended    3x10 reps       Recumbent Bike    8 minutes       Eliptical    8 minutes      Side Lying Clams   3x10 reps red band      Bridges- single leg bridge   3x10 reps      Bridges from Ball with Hamstring Curls    2x10 reps      Prone Hamstring Curls   3x10 reps left 7 lbs      Prone Hip Extension with Knee Extended  3x10 reps B      Seated Internal Rotation- ball between knees, red band around ankles    3x10 reps       Side Lying Hip Adduction   3x10 reps left       Side Lying Hip Abduction   3x10 reps left      Seated Sciatic Nerve Glides  3x10 reps left    Hip Thrusts  3x10 reps 15 lbs double leg  2x10 reps single leg B   TA  Standing Hamstring Curls    2x10 reps left      Elliptical x 8 minutes   ?  Standing Heel Raises- two up one down left   x  3x10 reps       Single leg calf raise standing   unable Patient unable to perform on LLE so deferred      Matrix single leg calf raise   3 x 10 bilateral 35lbs     Matrix Leg Press X  x 3x10 reps 155 lbs double leg  3x10 reps 85 lbs single leg B    Matrix Hamstring Curls X  x 3x10 reps 65 lbs Double leg  3x10 reps 25 lbs single leg     Matrix Knee Extensions X  x 3x10 reps 75 lbs double leg   3x10 reps 25 lbs single leg    Standing Hip Abduction and Extension  Green band 2x10 reps B each direction    Lateral heel tap with yellow bar for support  2 x 10 bilateral    TRX squats  3 x 10    Standing Hip Hikes from 6 inch step  3x10 reps, using stick for UE support    Single Leg Balance  4x30 sec holds left     Side stepping   3 laps with green band around calves on turf    ?  Soleus Raises from chair    3x10 reps 25 lbs      Standing Dorsiflexion- performed in unison    3x10 reps     PLAN  Matrix Knee Extension and Flexion  Matrix  Soleus Raises ?  ?      ?   ?   CPT Codes available for Billing:    (08) minutes of Manual therapy (MT) to improve pain and ROM.   (10) minutes of Therapeutic Exercise (TE) to develop strength, endurance, range of motion, and flexibility.   (00) minutes of Neuromuscular Re-Education (NMR)? to improve: Balance, Coordination, Kinesthetic, Sense, Proprioception, and Posture.   (42) minutes of Therapeutic Activities (TA) to improve functional performance.   Vasopneumatic Device Therapy () for management of swelling/edema. (91864)   Unattended Electrical Stimulation (ES) for muscle performance or pain modulation.   BFR: Blood flow restriction applied during exercise   ?      Assessment & Plan   Assessment: Assessed strength using MicroFet dynamometer to get numeric data for strength bilaterally. Patient has deficits in all muscle groups on left side as compared to right. Patient will continue to benefit from skilled Physical Therapy to address strength deficits and return to prior level of function including regular gym going to maintain healthy lifestyle.  Evaluation/Treatment Tolerance: Patient tolerated treatment well    Patient will continue to benefit from skilled outpatient physical therapy to address the deficits listed in the problem list box on initial evaluation, provide pt/family education and to maximize pt's level of independence in the home and community environment.     Patient's spiritual, cultural, and educational needs considered and patient agreeable to plan of care and goals.           Plan: Continue POC and frequency as planned. Adjust/modify treatment based off of previous response to treatment.    Goals:   Active       Long Term Goals       Pt will report worst pain of 2/10 in order to progress toward max functional ability and improve quality of life          (Progressing)       Start:  04/16/25    Expected End:  06/11/25            Patient will demonstrate improved function as indicated by a  score of greater than or equal to 75 out of 100 on FOTO.              (Progressing)       Start:  04/16/25    Expected End:  06/11/25            Patient will improve strength to at least 4+/5 grossly,  in order to improve functional independence and quality of life.   (Progressing)       Start:  04/16/25    Expected End:  06/11/25               Short Term Goals       Pt will report worst pain of 5/10 in order to progress toward max functional ability and improve quality of life          (Met)       Start:  04/16/25    Expected End:  05/14/25    Resolved:  06/10/25         Patient will improve strength by 1/2 a grade, in order to progress towards independence with functional activities.          (Met)       Start:  04/16/25    Expected End:  05/14/25    Resolved:  06/10/25         Patient will demonstrate improved function as indicated by a score of greater than or equal to 65 out of 100 on FOTO.          (Progressing)       Start:  04/16/25    Expected End:  05/14/25                  Ally Sutton, PT

## 2025-06-11 ENCOUNTER — CLINICAL SUPPORT (OUTPATIENT)
Dept: REHABILITATION | Facility: HOSPITAL | Age: 50
End: 2025-06-11
Payer: COMMERCIAL

## 2025-06-11 DIAGNOSIS — M54.16 LUMBAR RADICULAR PAIN: Primary | ICD-10-CM

## 2025-06-11 DIAGNOSIS — Z78.9 IMPAIRED MOBILITY AND ADLS: ICD-10-CM

## 2025-06-11 DIAGNOSIS — Z74.09 IMPAIRED MOBILITY AND ADLS: ICD-10-CM

## 2025-06-11 DIAGNOSIS — R68.89 DECREASED STRENGTH, ENDURANCE, AND MOBILITY: ICD-10-CM

## 2025-06-11 DIAGNOSIS — Z74.09 DECREASED STRENGTH, ENDURANCE, AND MOBILITY: ICD-10-CM

## 2025-06-11 DIAGNOSIS — R53.1 DECREASED STRENGTH, ENDURANCE, AND MOBILITY: ICD-10-CM

## 2025-06-11 PROCEDURE — 97530 THERAPEUTIC ACTIVITIES: CPT

## 2025-06-11 PROCEDURE — 97140 MANUAL THERAPY 1/> REGIONS: CPT

## 2025-06-11 NOTE — PROGRESS NOTES
Outpatient Rehab  Physical Therapy Visit    Patient Name: Christo Banegas  MRN: [id number]  YOB: 1975  Encounter Date: 6/11/2025    Therapy Diagnosis:   Encounter Diagnoses   Name Primary?    Lumbar radicular pain Yes    Decreased strength, endurance, and mobility     Impaired mobility and ADLs          Physician: Jelly Chaney DO    Physician Orders: Eval and Treat  Medical Diagnosis: Left sided sciatica    Visit # / Visits Authorized:  15 / 20  Insurance Authorization Period: 4/14/2025 to 12/31/2025  Date of Evaluation: 4/14/2025  Plan of Care Certification: 6/9/2025 to 8/5/2025     PT/PTA:     Number of PTA visits since last PT visit:   Time In: 1628   Time Out: 1734  Total Time: 66   Total Billable Time: 66    FOTO:  Intake Score:  %  Survey Score 1:  %  Survey Score 2:  %         Subjective   Patient reports that he did not have significant soreness after last visit. Patient had a meeting where he sat for nearly 4 hours and did not have increased pain. Is having tightness in his left calf..  Pain reported as 0/10. left low back, buttock, lower extremity, 2/10 when sitting    Objective             Treatment:     CPT  Intervention  Performed    Today  Duration / Intensity    MT  Soft Tissue Mobilizations: left gastrocnemius  x       Cupping   8 minutes to hamstring and calf left     Functional Dry Needling  x Verbal and written consent provided from patient      Performed to left medial gastrocnemius with electrical stimulation- proximal and distal   TE  Re-Assessed Objective Measurements      ?          ?                    NMR?  Pelvic Tilts    3x10 reps 3 sec holds       Abdominal Bracing 90/90 holds    2x10 reps      90/90 Heel Taps    3x10 reps     ?  Supine Glutes Squeezes- knees extended    3x10 reps       Recumbent Bike    8 minutes       Eliptical     8 minutes      Side Lying Clams    3x10 reps red band      Bridges- single leg bridge    3x10 reps       Bridges from Ball with Hamstring  Curls     2x10 reps      Prone Hamstring Curls    3x10 reps left 7 lbs      Prone Hip Extension with Knee Extended   3x10 reps B      Seated Internal Rotation- ball between knees, red band around ankles    3x10 reps       Side Lying Hip Adduction    3x10 reps left       Side Lying Hip Abduction    3x10 reps left       Seated Sciatic Nerve Glides   3x10 reps left     Hip Thrusts   3x10 reps 15 lbs double leg  2x10 reps single leg B   TA  Standing Hamstring Curls    2x10 reps left       Elliptical x 8 minutes   ?  Standing Heel Raises- two up one down left   x  3x10 reps  from 1st step     Single leg calf raise standing   unable Patient unable to perform on LLE so deferred      Matrix single leg calf raise   x 3 x 10 bilateral 25lbs      Matrix Leg Press X  x 3x10 reps 155 lbs double leg  3x10 reps 85->95 lbs single leg B     Matrix Hamstring Curls X  x 3x10 reps 65 lbs Double leg  3x10 reps 25 lbs single leg      Matrix Knee Extensions X  x 3x10 reps 75 lbs double leg   3x10 reps 25 lbs single leg     Standing Hip Abduction and Extension   Green band 2x10 reps B each direction     Lateral heel tap with yellow bar for support   2 x 10 bilateral     TRX squats   3 x 10     Standing Hip Hikes from 6 inch step   3x10 reps, using stick for UE support     Single Leg Balance   4x30 sec holds left     Side stepping    3 laps with green band around calves on turf    ?  Soleus Raises from chair    3x10 reps 25 lbs      Standing Dorsiflexion- performed in unison    3x10 reps     PLAN  Matrix Knee Extension and Flexion  Matrix Soleus Raises ?  ?       ?   ?   CPT Codes available for Billing:    (12) minutes of Manual therapy (MT) to improve pain and ROM.   (00) minutes of Therapeutic Exercise (TE) to develop strength, endurance, range of motion, and flexibility.   (00) minutes of Neuromuscular Re-Education (NMR)? to improve: Balance, Coordination, Kinesthetic, Sense, Proprioception, and Posture.   (54) minutes of Therapeutic  Activities (TA) to improve functional performance.   Vasopneumatic Device Therapy () for management of swelling/edema. (92605)   Unattended Electrical Stimulation (ES) for muscle performance or pain modulation.   BFR: Blood flow restriction applied during exercise ?      Assessment & Plan   Assessment: Patient with improved range of heel raise controlling eccentrically on stairs although still very fatiguing and not full range yet. Patient with improving endurance with machines strengthening although still very fatigued following. Cueing provided throughout for form and sequencing. Significant improvements in muscle tone following Dry Needling performed today.  Evaluation/Treatment Tolerance: Patient tolerated treatment well    Patient will continue to benefit from skilled outpatient physical therapy to address the deficits listed in the problem list box on initial evaluation, provide pt/family education and to maximize pt's level of independence in the home and community environment.     Patient's spiritual, cultural, and educational needs considered and patient agreeable to plan of care and goals.           Plan: Continue POC and frequency as planned. Adjust/modify treatment based off of previous response to treatment.    Goals:   Active       Long Term Goals       Pt will report worst pain of 2/10 in order to progress toward max functional ability and improve quality of life          (Progressing)       Start:  04/16/25    Expected End:  06/11/25            Patient will demonstrate improved function as indicated by a score of greater than or equal to 75 out of 100 on FOTO.              (Progressing)       Start:  04/16/25    Expected End:  06/11/25            Patient will improve strength to at least 4+/5 grossly,  in order to improve functional independence and quality of life.   (Progressing)       Start:  04/16/25    Expected End:  06/11/25               Short Term Goals       Pt will report worst pain of  5/10 in order to progress toward max functional ability and improve quality of life          (Met)       Start:  04/16/25    Expected End:  05/14/25    Resolved:  06/10/25         Patient will improve strength by 1/2 a grade, in order to progress towards independence with functional activities.          (Met)       Start:  04/16/25    Expected End:  05/14/25    Resolved:  06/10/25         Patient will demonstrate improved function as indicated by a score of greater than or equal to 65 out of 100 on FOTO.          (Progressing)       Start:  04/16/25    Expected End:  05/14/25                    Ally Sutton, PT

## 2025-06-13 ENCOUNTER — RESULTS FOLLOW-UP (OUTPATIENT)
Dept: INTERNAL MEDICINE | Facility: CLINIC | Age: 50
End: 2025-06-13

## 2025-06-16 ENCOUNTER — CLINICAL SUPPORT (OUTPATIENT)
Dept: REHABILITATION | Facility: HOSPITAL | Age: 50
End: 2025-06-16
Payer: COMMERCIAL

## 2025-06-16 DIAGNOSIS — Z78.9 IMPAIRED MOBILITY AND ADLS: ICD-10-CM

## 2025-06-16 DIAGNOSIS — R53.1 DECREASED STRENGTH, ENDURANCE, AND MOBILITY: ICD-10-CM

## 2025-06-16 DIAGNOSIS — R68.89 DECREASED STRENGTH, ENDURANCE, AND MOBILITY: ICD-10-CM

## 2025-06-16 DIAGNOSIS — M54.16 LUMBAR RADICULAR PAIN: Primary | ICD-10-CM

## 2025-06-16 DIAGNOSIS — Z74.09 IMPAIRED MOBILITY AND ADLS: ICD-10-CM

## 2025-06-16 DIAGNOSIS — Z74.09 DECREASED STRENGTH, ENDURANCE, AND MOBILITY: ICD-10-CM

## 2025-06-16 PROCEDURE — 97530 THERAPEUTIC ACTIVITIES: CPT

## 2025-06-16 PROCEDURE — 97140 MANUAL THERAPY 1/> REGIONS: CPT

## 2025-06-16 NOTE — PROGRESS NOTES
Outpatient Rehab  Physical Therapy Visit    Patient Name: Christo Banegas  MRN: [id number]  YOB: 1975  Encounter Date: 6/16/2025    Therapy Diagnosis:   Encounter Diagnoses   Name Primary?    Lumbar radicular pain Yes    Decreased strength, endurance, and mobility     Impaired mobility and ADLs          Physician: Jelly Chaney DO    Physician Orders: Eval and Treat  Medical Diagnosis: Left sided sciatica    Visit # / Visits Authorized:  16 / 20  Insurance Authorization Period: 4/14/2025 to 12/31/2025  Date of Evaluation: 4/14/2025  Plan of Care Certification: 6/9/2025 to 8/5/2025     PT/PTA:     Number of PTA visits since last PT visit:   Time In: 1630   Time Out: 1740  Total Time: 70   Total Billable Time: 70    FOTO:  Intake Score:  %  Survey Score 1:  %  Survey Score 2:  %         Subjective   Patient reports that he is doing okay. No significant complaints..  Pain reported as 0/10. left low back, buttock, lower extremity    Objective             Treatment:     CPT  Intervention  Performed    Today  Duration / Intensity    MT  Soft Tissue Mobilizations: left gastrocnemius         Cupping   8 minutes to hamstring and calf left     Functional Dry Needling  x Verbal and written consent provided from patient      Performed to left lateral gastrocnemius with electrical stimulation- proximal and distal   TE  Re-Assessed Objective Measurements      ?          ?                    NMR?  Pelvic Tilts    3x10 reps 3 sec holds       Abdominal Bracing 90/90 holds    2x10 reps      90/90 Heel Taps    3x10 reps     ?  Supine Glutes Squeezes- knees extended    3x10 reps       Recumbent Bike    8 minutes       Eliptical     8 minutes      Side Lying Clams    3x10 reps red band      Bridges- single leg bridge    3x10 reps       Bridges from Ball with Hamstring Curls     2x10 reps      Prone Hamstring Curls    3x10 reps left 7 lbs      Prone Hip Extension with Knee Extended   3x10 reps B      Seated Internal  Rotation- ball between knees, red band around ankles    3x10 reps       Side Lying Hip Adduction    3x10 reps left       Side Lying Hip Abduction    3x10 reps left       Seated Sciatic Nerve Glides   3x10 reps left     Hip Thrusts   3x10 reps 15 lbs double leg  2x10 reps single leg B   TA  Standing Hamstring Curls    2x10 reps left       Elliptical  8 minutes   ?  Standing Heel Raises- two up one down left   x  3x10 reps  from 1st step     Single leg calf raise standing   Patient unable to perform on LLE so deferred      Matrix single leg calf raise   3 x 10 bilateral 25lbs      Matrix Leg Press X  x 3x10 reps 155 lbs double leg  3x10 reps 85->95 lbs single leg B     Matrix Hamstring Curls X  x x10 reps 75,85,95 lbs Double leg  3x10 reps 45->55 lbs single leg      Matrix Knee Extensions X  x 3x10 reps 75 lbs double leg   3x10 reps 25 lbs single leg     Standing Hip Abduction and Extension   Green band 2x10 reps B each direction     Lateral heel tap with yellow bar for support   2 x 10 bilateral     TRX squats   3 x 10     Standing Hip Hikes from 6 inch step   3x10 reps, using stick for UE support     Single Leg Balance   4x30 sec holds left     Side stepping   x 4 laps with green band around ankles on turf    ?  Soleus Raises from chair    3x10 reps 25 lbs      Standing Dorsiflexion- performed in unison    3x10 reps     PLAN  Matrix Knee Extension and Flexion  Matrix Soleus Raises ?  ?       ?   ?   CPT Codes available for Billing:    (10) minutes of Manual therapy (MT) to improve pain and ROM.   (00) minutes of Therapeutic Exercise (TE) to develop strength, endurance, range of motion, and flexibility.   (00) minutes of Neuromuscular Re-Education (NMR)? to improve: Balance, Coordination, Kinesthetic, Sense, Proprioception, and Posture.   (60) minutes of Therapeutic Activities (TA) to improve functional performance.   Vasopneumatic Device Therapy () for management of swelling/edema. (58358)   Unattended Electrical  Stimulation (ES) for muscle performance or pain modulation.   BFR: Blood flow restriction applied during exercise ?      Assessment & Plan   Assessment: Patient with better tolerance and the ability to increase weight for all machines strengthening. Patient also noted with better ability to maintain height of heel raise when controlling eccentrically which he previously has been unable to do. Incorporated RDLs to assist with improving hamstring and low back strength. Cueing provided throughout to assist with improving form.  Evaluation/Treatment Tolerance: Patient tolerated treatment well    Patient will continue to benefit from skilled outpatient physical therapy to address the deficits listed in the problem list box on initial evaluation, provide pt/family education and to maximize pt's level of independence in the home and community environment.     Patient's spiritual, cultural, and educational needs considered and patient agreeable to plan of care and goals.           Plan: Continue POC and frequency as planned. Adjust/modify treatment based off of previous response to treatment.    Goals:   Active       Long Term Goals       Pt will report worst pain of 2/10 in order to progress toward max functional ability and improve quality of life          (Progressing)       Start:  04/16/25    Expected End:  06/11/25            Patient will demonstrate improved function as indicated by a score of greater than or equal to 75 out of 100 on FOTO.              (Progressing)       Start:  04/16/25    Expected End:  06/11/25            Patient will improve strength to at least 4+/5 grossly,  in order to improve functional independence and quality of life.   (Progressing)       Start:  04/16/25    Expected End:  06/11/25               Short Term Goals       Pt will report worst pain of 5/10 in order to progress toward max functional ability and improve quality of life          (Met)       Start:  04/16/25    Expected End:   05/14/25    Resolved:  06/10/25         Patient will improve strength by 1/2 a grade, in order to progress towards independence with functional activities.          (Met)       Start:  04/16/25    Expected End:  05/14/25    Resolved:  06/10/25         Patient will demonstrate improved function as indicated by a score of greater than or equal to 65 out of 100 on FOTO.          (Progressing)       Start:  04/16/25    Expected End:  05/14/25                    Ally Sutton, PT

## 2025-06-23 ENCOUNTER — CLINICAL SUPPORT (OUTPATIENT)
Dept: REHABILITATION | Facility: HOSPITAL | Age: 50
End: 2025-06-23
Payer: COMMERCIAL

## 2025-06-23 DIAGNOSIS — Z74.09 DECREASED STRENGTH, ENDURANCE, AND MOBILITY: ICD-10-CM

## 2025-06-23 DIAGNOSIS — R68.89 DECREASED STRENGTH, ENDURANCE, AND MOBILITY: ICD-10-CM

## 2025-06-23 DIAGNOSIS — Z78.9 IMPAIRED MOBILITY AND ADLS: ICD-10-CM

## 2025-06-23 DIAGNOSIS — R53.1 DECREASED STRENGTH, ENDURANCE, AND MOBILITY: ICD-10-CM

## 2025-06-23 DIAGNOSIS — M54.16 LUMBAR RADICULAR PAIN: Primary | ICD-10-CM

## 2025-06-23 DIAGNOSIS — Z74.09 IMPAIRED MOBILITY AND ADLS: ICD-10-CM

## 2025-06-23 PROCEDURE — 97530 THERAPEUTIC ACTIVITIES: CPT

## 2025-06-23 PROCEDURE — 97140 MANUAL THERAPY 1/> REGIONS: CPT

## 2025-06-23 NOTE — PROGRESS NOTES
Outpatient Rehab  Physical Therapy Visit    Patient Name: Christo Banegas  MRN: [id number]  YOB: 1975  Encounter Date: 6/23/2025    Therapy Diagnosis:   Encounter Diagnoses   Name Primary?    Lumbar radicular pain Yes    Decreased strength, endurance, and mobility     Impaired mobility and ADLs          Physician: Jelly Chaney DO    Physician Orders: Eval and Treat  Medical Diagnosis: Left sided sciatica    Visit # / Visits Authorized:  17 / 32  Insurance Authorization Period: 4/14/2025 to 12/31/2025  Date of Evaluation: 4/14/2025  Plan of Care Certification: 6/9/2025 to 8/5/2025     PT/PTA:     Number of PTA visits since last PT visit:   Time In: 1630   Time Out: 1730  Total Time: 60   Total Billable Time: 60    FOTO:  Intake Score:  %  Survey Score 1:  %  Survey Score 2:  %         Subjective   Patient reports that he has been doing okay. Has been really busy with work so he has not been to the gym in the last week..  Pain reported as 0/10. left low back, buttock, lower extremity    Objective             Treatment:     CPT  Intervention  Performed    Today  Duration / Intensity    MT  Soft Tissue Mobilizations: left gastrocnemius         Cupping   8 minutes to hamstring and calf left     Functional Dry Needling  x Verbal and written consent provided from patient      Performed to left lateral gastrocnemius with electrical stimulation- proximal and distal   TE  Re-Assessed Objective Measurements      ?          ?                    NMR?  Pelvic Tilts    3x10 reps 3 sec holds       Abdominal Bracing 90/90 holds    2x10 reps      90/90 Heel Taps    3x10 reps     ?  Supine Glutes Squeezes- knees extended    3x10 reps       Recumbent Bike    8 minutes       Eliptical     8 minutes      Side Lying Clams    3x10 reps red band      Bridges- single leg bridge    3x10 reps       Bridges from Ball with Hamstring Curls     2x10 reps      Prone Hamstring Curls    3x10 reps left 7 lbs      Prone Hip Extension  with Knee Extended   3x10 reps B      Seated Internal Rotation- ball between knees, red band around ankles    3x10 reps       Side Lying Hip Adduction    3x10 reps left       Side Lying Hip Abduction    3x10 reps left       Seated Sciatic Nerve Glides   3x10 reps left     Hip Thrusts   3x10 reps 15 lbs double leg  2x10 reps single leg B   TA  Standing Hamstring Curls    2x10 reps left       Elliptical  8 minutes   ?  Standing Heel Raises- two up one down left   x  3x10 reps  from 1st step     Single leg calf raise standing   Patient unable to perform on LLE so deferred      Matrix single leg calf raise   3 x 10 bilateral 25lbs      Matrix Leg Press X  x 3x10 reps 165 lbs double leg  3x10 reps 85 lbs single leg B     Matrix Hamstring Curls X    x x10 reps 95 lbs Double leg  3x10 reps 35 lbs single leg      Matrix Knee Extensions X    x 3x10 reps 75 lbs double leg   3x10 reps 55 lbs single leg     Standing Hip Abduction and Extension   Green band 2x10 reps B each direction     Lateral heel tap with yellow bar for support   2 x 10 bilateral     TRX squats   3 x 10     Standing Hip Hikes from 6 inch step   3x10 reps, using stick for UE support     Single Leg Balance   4x30 sec holds left     Side stepping    4 laps with green band around ankles on turf    ?  Soleus Raises from chair    3x10 reps 25 lbs      Standing Dorsiflexion- performed in unison    3x10 reps     PLAN  Matrix Knee Extension and Flexion  Matrix Soleus Raises ?  ?       ?   ?   CPT Codes available for Billing:    (10) minutes of Manual therapy (MT) to improve pain and ROM.   (00) minutes of Therapeutic Exercise (TE) to develop strength, endurance, range of motion, and flexibility.   (00) minutes of Neuromuscular Re-Education (NMR)? to improve: Balance, Coordination, Kinesthetic, Sense, Proprioception, and Posture.   (50) minutes of Therapeutic Activities (TA) to improve functional performance.   Vasopneumatic Device Therapy () for management of  swelling/edema. (80240)   Unattended Electrical Stimulation (ES) for muscle performance or pain modulation.   BFR: Blood flow restriction applied during exercise ?      Assessment & Plan   Assessment: Patient with muscle fatigue following Dry Needling although he was able to demonstrate better height with heel raises and demonstrate small range single leg heel raise. Patient was able to increase weight for most exercises but by the time he got to single leg hamstring curls was too fatigued to maintain previous weight. Encouraged patient to be more consistent in the gym outside of therapy.   Evaluation/Treatment Tolerance: Patient tolerated treatment well    Patient will continue to benefit from skilled outpatient physical therapy to address the deficits listed in the problem list box on initial evaluation, provide pt/family education and to maximize pt's level of independence in the home and community environment.     Patient's spiritual, cultural, and educational needs considered and patient agreeable to plan of care and goals.           Plan: Continue POC and frequency as planned. Adjust/modify treatment based off of previous response to treatment.    Goals:   Active       Long Term Goals       Pt will report worst pain of 2/10 in order to progress toward max functional ability and improve quality of life          (Progressing)       Start:  04/16/25    Expected End:  06/11/25            Patient will demonstrate improved function as indicated by a score of greater than or equal to 75 out of 100 on FOTO.              (Progressing)       Start:  04/16/25    Expected End:  06/11/25            Patient will improve strength to at least 4+/5 grossly,  in order to improve functional independence and quality of life.   (Progressing)       Start:  04/16/25    Expected End:  06/11/25               Short Term Goals       Pt will report worst pain of 5/10 in order to progress toward max functional ability and improve quality  of life          (Met)       Start:  04/16/25    Expected End:  05/14/25    Resolved:  06/10/25         Patient will improve strength by 1/2 a grade, in order to progress towards independence with functional activities.          (Met)       Start:  04/16/25    Expected End:  05/14/25    Resolved:  06/10/25         Patient will demonstrate improved function as indicated by a score of greater than or equal to 65 out of 100 on FOTO.          (Progressing)       Start:  04/16/25    Expected End:  05/14/25                    Ally Sutton, PT

## 2025-07-10 ENCOUNTER — CLINICAL SUPPORT (OUTPATIENT)
Dept: REHABILITATION | Facility: HOSPITAL | Age: 50
End: 2025-07-10
Payer: COMMERCIAL

## 2025-07-10 DIAGNOSIS — R53.1 DECREASED STRENGTH, ENDURANCE, AND MOBILITY: ICD-10-CM

## 2025-07-10 DIAGNOSIS — M54.16 LUMBAR RADICULAR PAIN: Primary | ICD-10-CM

## 2025-07-10 DIAGNOSIS — Z74.09 IMPAIRED MOBILITY AND ADLS: ICD-10-CM

## 2025-07-10 DIAGNOSIS — Z78.9 IMPAIRED MOBILITY AND ADLS: ICD-10-CM

## 2025-07-10 DIAGNOSIS — Z74.09 DECREASED STRENGTH, ENDURANCE, AND MOBILITY: ICD-10-CM

## 2025-07-10 DIAGNOSIS — R68.89 DECREASED STRENGTH, ENDURANCE, AND MOBILITY: ICD-10-CM

## 2025-07-10 PROCEDURE — 97530 THERAPEUTIC ACTIVITIES: CPT

## 2025-07-10 PROCEDURE — 97112 NEUROMUSCULAR REEDUCATION: CPT

## 2025-07-10 NOTE — PROGRESS NOTES
Outpatient Rehab  Physical Therapy Visit    Patient Name: Christo Banegas  MRN: [id number]  YOB: 1975  Encounter Date: 7/10/2025    Therapy Diagnosis:   Encounter Diagnoses   Name Primary?    Lumbar radicular pain Yes    Decreased strength, endurance, and mobility     Impaired mobility and ADLs        Physician: Jelly Chaney DO    Physician Orders: Eval and Treat  Medical Diagnosis: Left sided sciatica    Visit # / Visits Authorized:  18 / 32  Insurance Authorization Period: 4/14/2025 to 12/31/2025  Date of Evaluation: 4/14/2025  Plan of Care Certification: 6/9/2025 to 8/5/2025     PT/PTA:     Number of PTA visits since last PT visit:   Time In: 0730   Time Out: 0836  Total Time: 66   Total Billable Time: 66    FOTO:  Intake Score:  %  Survey Score 1:  %  Survey Score 2:  %         Subjective   Patient reports that he has been doing okay. Was out of town on a trip which required hiking at times. Definitely felt fatigue in his leg but felt like as he continued to hike this improved. Did have an instance of cramping in his leg yesterday. Feels like he is getting a little stronger..  Pain reported as 0/10. left low back, buttock, lower extremity    Objective             Treatment:        CPT  Intervention  Performed    Today  Duration / Intensity    MT  Soft Tissue Mobilizations: left gastrocnemius          Cupping   8 minutes to hamstring and calf left     Functional Dry Needling   Verbal and written consent provided from patient      Performed to left lateral gastrocnemius with electrical stimulation- proximal and distal   TE  Re-Assessed Objective Measurements       NMR  Pelvic Tilts    3x10 reps 3 sec holds       Abdominal Bracing 90/90 holds    2x10 reps      90/90 Heel Taps    3x10 reps       Supine Glutes Squeezes- knees extended    3x10 reps       Recumbent Bike    8 minutes       Eliptical     8 minutes      Side Lying Clams    3x10 reps red band      Bridges- single leg bridge    3x10 reps        Bridges from Ball with Hamstring Curls     2x10 reps      Prone Hamstring Curls    3x10 reps left 7 lbs      Prone Hip Extension with Knee Extended   3x10 reps B      Seated Internal Rotation- ball between knees, red band around ankles    3x10 reps       Side Lying Hip Adduction    3x10 reps left       Side Lying Hip Abduction    3x10 reps left       Seated Sciatic Nerve Glides   3x10 reps left     Hip Thrusts x  x 3x10 reps 15 lbs double leg  2x10 reps single leg B   TA  Standing Hamstring Curls    2x10 reps left       Elliptical x 7 minutes level 7     Standing Heel Raises- two up one down left   X 3x10 reps  from 1st step     Single leg calf raise standing    Patient unable to perform on LLE so deferred      Matrix single leg calf raise    3 x 10 bilateral 25lbs      Matrix Leg Press X  x 3x10 reps 165 lbs double leg  3x10 reps 85 lbs single leg B     Matrix Hamstring Curls x  x x10 reps 95 lbs Double leg  3x10 reps 35 lbs single leg (dropped to 25 lbs)     Matrix Knee Extensions X  x 3x10 reps 75 lbs double leg (95 the rest)  3x10 reps 55 lbs single leg     Standing Hip Abduction and Extension  x Green band 2x10 reps B each direction     Lateral heel tap with yellow bar for support   2 x 10 bilateral     TRX squats   3 x 10     Standing Hip Hikes from 6 inch step   3x10 reps, using stick for UE support     Single Leg Balance  x 4x30 sec holds left     Side stepping   x 4 laps with green band around ankles on turf      Soleus Raises with Matrix  x 3x10 reps 45 lbs (55 next time)     Standing Dorsiflexion- performed in unison    3x10 reps     PLAN  Matrix Knee Extension and Flexion  Matrix Soleus Raises                CPT Codes available for Billing:    (00) minutes of Manual therapy (MT) to improve pain and ROM.   (00) minutes of Therapeutic Exercise (TE) to develop strength, endurance, range of motion, and flexibility.   (08) minutes of Neuromuscular Re-Education (NMR)? to improve: Balance, Coordination,  Kinesthetic, Sense, Proprioception, and Posture.   (58) minutes of Therapeutic Activities (TA) to improve functional performance.   Vasopneumatic Device Therapy () for management of swelling/edema. (35979)   Unattended Electrical Stimulation (ES) for muscle performance or pain modulation.   BFR: Blood flow restriction applied during exercise   ?      Assessment & Plan   Assessment: Deferred manual therapy today to focus on strength training due to extended break from therapy. Patient with less reliance on upper extremities with heel raises today. Incorporated soleus raises with matrix and progressed weight on a couple different machines. Patient with fatigue by the end of the session with more compensatory strategies noted when ambulating. Discussed getting back in the gym more consistently to allow for progressions between sessions.   Evaluation/Treatment Tolerance: Patient tolerated treatment well    Patient will continue to benefit from skilled outpatient physical therapy to address the deficits listed in the problem list box on initial evaluation, provide pt/family education and to maximize pt's level of independence in the home and community environment.     Patient's spiritual, cultural, and educational needs considered and patient agreeable to plan of care and goals.           Plan: Continue POC and frequency as planned. Adjust/modify treatment based off of previous response to treatment.    Goals:   Active       Long Term Goals       Pt will report worst pain of 2/10 in order to progress toward max functional ability and improve quality of life          (Progressing)       Start:  04/16/25    Expected End:  06/11/25            Patient will demonstrate improved function as indicated by a score of greater than or equal to 75 out of 100 on FOTO.              (Progressing)       Start:  04/16/25    Expected End:  06/11/25            Patient will improve strength to at least 4+/5 grossly,  in order to improve  functional independence and quality of life.   (Progressing)       Start:  04/16/25    Expected End:  06/11/25               Short Term Goals       Pt will report worst pain of 5/10 in order to progress toward max functional ability and improve quality of life          (Met)       Start:  04/16/25    Expected End:  05/14/25    Resolved:  06/10/25         Patient will improve strength by 1/2 a grade, in order to progress towards independence with functional activities.          (Met)       Start:  04/16/25    Expected End:  05/14/25    Resolved:  06/10/25         Patient will demonstrate improved function as indicated by a score of greater than or equal to 65 out of 100 on FOTO.          (Progressing)       Start:  04/16/25    Expected End:  05/14/25                      Ally Sutton, PT

## 2025-07-21 ENCOUNTER — LAB VISIT (OUTPATIENT)
Dept: LAB | Facility: HOSPITAL | Age: 50
End: 2025-07-21
Attending: INTERNAL MEDICINE
Payer: COMMERCIAL

## 2025-07-21 ENCOUNTER — OFFICE VISIT (OUTPATIENT)
Dept: INTERNAL MEDICINE | Facility: CLINIC | Age: 50
End: 2025-07-21
Payer: COMMERCIAL

## 2025-07-21 VITALS
DIASTOLIC BLOOD PRESSURE: 88 MMHG | HEART RATE: 104 BPM | OXYGEN SATURATION: 95 % | WEIGHT: 203.06 LBS | SYSTOLIC BLOOD PRESSURE: 130 MMHG | BODY MASS INDEX: 27.5 KG/M2 | TEMPERATURE: 99 F | HEIGHT: 72 IN

## 2025-07-21 DIAGNOSIS — R19.7 DIARRHEA, UNSPECIFIED TYPE: ICD-10-CM

## 2025-07-21 DIAGNOSIS — R93.1 ELEVATED CORONARY ARTERY CALCIUM SCORE: ICD-10-CM

## 2025-07-21 DIAGNOSIS — Z11.3 SCREENING EXAMINATION FOR STD (SEXUALLY TRANSMITTED DISEASE): ICD-10-CM

## 2025-07-21 DIAGNOSIS — I10 PRIMARY HYPERTENSION: ICD-10-CM

## 2025-07-21 DIAGNOSIS — R19.7 DIARRHEA, UNSPECIFIED TYPE: Primary | ICD-10-CM

## 2025-07-21 LAB
ABSOLUTE EOSINOPHIL (OHS): 0.08 K/UL
ABSOLUTE MONOCYTE (OHS): 1.26 K/UL (ref 0.3–1)
ABSOLUTE NEUTROPHIL COUNT (OHS): 5.92 K/UL (ref 1.8–7.7)
ALBUMIN SERPL BCP-MCNC: 3.8 G/DL (ref 3.5–5.2)
ALP SERPL-CCNC: 60 UNIT/L (ref 40–150)
ALT SERPL W/O P-5'-P-CCNC: 17 UNIT/L (ref 10–44)
ANION GAP (OHS): 10 MMOL/L (ref 8–16)
AST SERPL-CCNC: 19 UNIT/L (ref 11–45)
BASOPHILS # BLD AUTO: 0.03 K/UL
BASOPHILS NFR BLD AUTO: 0.3 %
BILIRUB SERPL-MCNC: 0.4 MG/DL (ref 0.1–1)
BUN SERPL-MCNC: 13 MG/DL (ref 6–20)
CALCIUM SERPL-MCNC: 9.4 MG/DL (ref 8.7–10.5)
CHLORIDE SERPL-SCNC: 99 MMOL/L (ref 95–110)
CO2 SERPL-SCNC: 27 MMOL/L (ref 23–29)
CREAT SERPL-MCNC: 1 MG/DL (ref 0.5–1.4)
ERYTHROCYTE [DISTWIDTH] IN BLOOD BY AUTOMATED COUNT: 13.1 % (ref 11.5–14.5)
GFR SERPLBLD CREATININE-BSD FMLA CKD-EPI: >60 ML/MIN/1.73/M2
GLUCOSE SERPL-MCNC: 93 MG/DL (ref 70–110)
HCT VFR BLD AUTO: 44.5 % (ref 40–54)
HGB BLD-MCNC: 15 GM/DL (ref 14–18)
IMM GRANULOCYTES # BLD AUTO: 0.03 K/UL (ref 0–0.04)
IMM GRANULOCYTES NFR BLD AUTO: 0.3 % (ref 0–0.5)
LYMPHOCYTES # BLD AUTO: 1.43 K/UL (ref 1–4.8)
MCH RBC QN AUTO: 30.1 PG (ref 27–31)
MCHC RBC AUTO-ENTMCNC: 33.7 G/DL (ref 32–36)
MCV RBC AUTO: 89 FL (ref 82–98)
NUCLEATED RBC (/100WBC) (OHS): 0 /100 WBC
PLATELET # BLD AUTO: 297 K/UL (ref 150–450)
PMV BLD AUTO: 10.7 FL (ref 9.2–12.9)
POTASSIUM SERPL-SCNC: 4.1 MMOL/L (ref 3.5–5.1)
PROT SERPL-MCNC: 7.7 GM/DL (ref 6–8.4)
RBC # BLD AUTO: 4.98 M/UL (ref 4.6–6.2)
RELATIVE EOSINOPHIL (OHS): 0.9 %
RELATIVE LYMPHOCYTE (OHS): 16.3 % (ref 18–48)
RELATIVE MONOCYTE (OHS): 14.4 % (ref 4–15)
RELATIVE NEUTROPHIL (OHS): 67.8 % (ref 38–73)
SODIUM SERPL-SCNC: 136 MMOL/L (ref 136–145)
WBC # BLD AUTO: 8.75 K/UL (ref 3.9–12.7)

## 2025-07-21 PROCEDURE — 99214 OFFICE O/P EST MOD 30 MIN: CPT | Mod: S$GLB,,, | Performed by: INTERNAL MEDICINE

## 2025-07-21 PROCEDURE — 87491 CHLMYD TRACH DNA AMP PROBE: CPT

## 2025-07-21 PROCEDURE — 99999 PR PBB SHADOW E&M-EST. PATIENT-LVL IV: CPT | Mod: PBBFAC,,, | Performed by: INTERNAL MEDICINE

## 2025-07-21 PROCEDURE — 36415 COLL VENOUS BLD VENIPUNCTURE: CPT

## 2025-07-21 PROCEDURE — 3008F BODY MASS INDEX DOCD: CPT | Mod: CPTII,S$GLB,, | Performed by: INTERNAL MEDICINE

## 2025-07-21 PROCEDURE — 3075F SYST BP GE 130 - 139MM HG: CPT | Mod: CPTII,S$GLB,, | Performed by: INTERNAL MEDICINE

## 2025-07-21 PROCEDURE — 82040 ASSAY OF SERUM ALBUMIN: CPT

## 2025-07-21 PROCEDURE — 1159F MED LIST DOCD IN RCRD: CPT | Mod: CPTII,S$GLB,, | Performed by: INTERNAL MEDICINE

## 2025-07-21 PROCEDURE — 4010F ACE/ARB THERAPY RXD/TAKEN: CPT | Mod: CPTII,S$GLB,, | Performed by: INTERNAL MEDICINE

## 2025-07-21 PROCEDURE — 3079F DIAST BP 80-89 MM HG: CPT | Mod: CPTII,S$GLB,, | Performed by: INTERNAL MEDICINE

## 2025-07-21 PROCEDURE — 3044F HG A1C LEVEL LT 7.0%: CPT | Mod: CPTII,S$GLB,, | Performed by: INTERNAL MEDICINE

## 2025-07-21 PROCEDURE — 85025 COMPLETE CBC W/AUTO DIFF WBC: CPT

## 2025-07-21 RX ORDER — CIPROFLOXACIN 500 MG/1
500 TABLET, FILM COATED ORAL 2 TIMES DAILY
Qty: 10 TABLET | Refills: 0 | Status: SHIPPED | OUTPATIENT
Start: 2025-07-21 | End: 2025-07-26

## 2025-07-21 NOTE — PROGRESS NOTES
Subjective:      Patient ID: Rafael Banegas is a 49 y.o. male.    Chief Complaint: Diarrhea    Diarrhea   This is a new problem. Episode onset: 4 days. The problem has been unchanged. The stool consistency is described as Watery. Associated symptoms include abdominal pain (abd cramping resolved with BM), a fever (102 yesterday, oral) and sweats. Pertinent negatives include no coughing, headaches or vomiting. Exacerbated by: eating. Treatments tried: prescription from  yesterday, pepto, tylenol(last dose 10 hours ago) The treatment provided no relief. There is no history of bowel resection, inflammatory bowel disease or irritable bowel syndrome.   Unprotected sex 7 to 8 days ago.   Insertive and receptive.   No rectal pain nor d/c.     History of Present Illness               Review of Systems   Constitutional:  Positive for fever (102 yesterday, oral).   Respiratory:  Negative for cough, shortness of breath and wheezing.    Cardiovascular:  Negative for chest pain.   Gastrointestinal:  Positive for abdominal pain (abd cramping resolved with BM) and diarrhea. Negative for abdominal distention, anal bleeding, blood in stool, constipation, nausea and vomiting.   Genitourinary:  Negative for dysuria, frequency and penile discharge.   Musculoskeletal:  Negative for gait problem and neck pain.   Skin:  Negative for rash and wound.   Neurological:  Negative for dizziness, syncope and headaches.     Objective:   /88   Pulse 104   Temp 98.8 °F (37.1 °C)   Ht 6' (1.829 m)   Wt 92.1 kg (203 lb 0.7 oz)   SpO2 95%   BMI 27.54 kg/m²     Physical Exam  Constitutional:       General: He is not in acute distress.     Appearance: He is well-developed.   HENT:      Mouth/Throat:      Mouth: Mucous membranes are moist.      Pharynx: Oropharynx is clear.   Eyes:      Conjunctiva/sclera: Conjunctivae normal.   Cardiovascular:      Rate and Rhythm: Normal rate.   Pulmonary:      Effort: Pulmonary effort is normal.       Breath sounds: Normal breath sounds. No wheezing or rales.   Abdominal:      Tenderness: There is no abdominal tenderness. There is no guarding.   Skin:     General: Skin is warm and dry.   Neurological:      Motor: No weakness.      Gait: Gait normal.   Psychiatric:         Mood and Affect: Mood normal.         Behavior: Behavior normal.         Lab Results   Component Value Date    WBC 5.36 06/07/2025    HGB 14.3 06/07/2025    HGB 14.5 04/08/2025    HGB 13.9 (L) 06/08/2024    HCT 43.8 06/07/2025    MCV 91 06/07/2025    MCV 92 04/08/2025    MCV 92 06/08/2024     06/07/2025    CHOL 204 (H) 06/07/2025    TRIG 182 (H) 06/07/2025    HDL 47 06/07/2025    LDLCALC 120.6 06/07/2025    LDLCALC 119.2 06/08/2024    LDLCALC 140.4 06/20/2023    ALT 25 06/07/2025    AST 29 06/07/2025     06/07/2025    K 4.7 06/07/2025    CALCIUM 9.4 06/07/2025     06/07/2025    CO2 27 06/07/2025    BUN 11 06/07/2025    CREATININE 0.9 06/07/2025    CREATININE 0.9 06/08/2024    CREATININE 0.8 03/16/2023    EGFRNORACEVR >60 06/07/2025    EGFRNORACEVR >60.0 06/08/2024    EGFRNORACEVR >60.0 03/16/2023    TSH 1.226 06/07/2025    TSH 1.050 06/08/2024    TSH 1.205 03/16/2023    PSA 2.47 06/07/2025    PSA 0.95 06/08/2024    PSA 1.0 03/16/2023    GLU 83 06/07/2025    HGBA1C 5.5 06/07/2025    HGBA1C 5.4 06/08/2024    HGBA1C 5.6 03/28/2022          The 10-year ASCVD risk score (Bev AGUILAR, et al., 2019) is: 9.4%    Values used to calculate the score:      Age: 49 years      Sex: Male      Is Non- : No      Diabetic: No      Tobacco smoker: Yes      Systolic Blood Pressure: 130 mmHg      Is BP treated: Yes      HDL Cholesterol: 47 mg/dL      Total Cholesterol: 204 mg/dL     Assessment:     1. Diarrhea, unspecified type    2. Screening examination for STD (sexually transmitted disease)    3. Elevated coronary artery calcium score    4. Primary hypertension      Plan:   1. Diarrhea, unspecified type  -      ciprofloxacin HCl (CIPRO) 500 MG tablet; Take 1 tablet (500 mg total) by mouth 2 (two) times daily. for 5 days  Dispense: 10 tablet; Refill: 0  -     Comprehensive Metabolic Panel; Future; Expected date: 07/21/2025    2. Screening examination for STD (sexually transmitted disease)  -     CBC Auto Differential; Future; Expected date: 07/21/2025  -     C. trachomatis/N. gonorrhoeae by AMP DNA; Future; Expected date: 07/21/2025  -     Cancel: C. trachomatis/N. gonorrhoeae by AMP DNA; Future; Expected date: 07/21/2025  -     C. trachomatis/N. gonorrhoeae by AMP DNA    3. Elevated coronary artery calcium score  Overview:  66 2022, statin initiated 3/2023      4. Primary hypertension  Overview:  Controlled.  Continue current medications          Patient Instructions   Imodium over counter as directed on package    At least 64 oz of water daily    Hydration can include sugar free sports drinks, Pedialyte, ice chips, Pedialyte pop    Future Appointments   Date Time Provider Department Center   7/21/2025  2:15 PM LABORATORY, [facility]   7/21/2025  2:35 PM LABORATORY, [facility] [place]   7/22/2025  7:30 AM Ally Sutton, PT [facility] [place]   8/1/2025 12:30 PM Ally Sutton, PT [facility] [place]   6/12/2026  1:40 PM Valdemar Mustafa MD [facility] [place]           Follow up if symptoms worsen or fail to improve.

## 2025-07-21 NOTE — PATIENT INSTRUCTIONS
Imodium over counter as directed on package    At least 64 oz of water daily    Hydration can include sugar free sports drinks, Pedialyte, ice chips, Pedialyte pop

## 2025-07-22 LAB
C TRACH DNA SPEC QL NAA+PROBE: NOT DETECTED
CTGC SOURCE (OHS) ORD-325: NORMAL
N GONORRHOEA DNA UR QL NAA+PROBE: NOT DETECTED

## 2025-08-01 ENCOUNTER — CLINICAL SUPPORT (OUTPATIENT)
Dept: REHABILITATION | Facility: HOSPITAL | Age: 50
End: 2025-08-01
Payer: COMMERCIAL

## 2025-08-01 DIAGNOSIS — Z74.09 DECREASED STRENGTH, ENDURANCE, AND MOBILITY: ICD-10-CM

## 2025-08-01 DIAGNOSIS — M54.16 LUMBAR RADICULAR PAIN: Primary | ICD-10-CM

## 2025-08-01 DIAGNOSIS — R68.89 DECREASED STRENGTH, ENDURANCE, AND MOBILITY: ICD-10-CM

## 2025-08-01 DIAGNOSIS — Z74.09 IMPAIRED MOBILITY AND ADLS: ICD-10-CM

## 2025-08-01 DIAGNOSIS — Z78.9 IMPAIRED MOBILITY AND ADLS: ICD-10-CM

## 2025-08-01 DIAGNOSIS — R53.1 DECREASED STRENGTH, ENDURANCE, AND MOBILITY: ICD-10-CM

## 2025-08-01 PROCEDURE — 97530 THERAPEUTIC ACTIVITIES: CPT

## 2025-08-01 NOTE — PROGRESS NOTES
Outpatient Rehab  Physical Therapy Visit    Patient Name: Christo Banegas  MRN: [id number]  YOB: 1975  Encounter Date: 8/1/2025    Therapy Diagnosis:   Encounter Diagnoses   Name Primary?    Lumbar radicular pain Yes    Decreased strength, endurance, and mobility     Impaired mobility and ADLs        Physician: Jelly Chaney DO    Physician Orders: Eval and Treat  Medical Diagnosis: Left sided sciatica    Visit # / Visits Authorized:  19 / 32  Insurance Authorization Period: 4/14/2025 to 12/31/2025  Date of Evaluation: 4/14/2025  Plan of Care Certification: 6/9/2025 to 8/5/2025     PT/PTA:     Number of PTA visits since last PT visit:   Time In: 1234   Time Out: 1334  Total Time: 60   Total Billable Time: 60    FOTO:  Intake Score:  %  Survey Score 1:  %  Survey Score 2:  %         Subjective   Patient reports that he was sick with a stomach virus for 4 days which is why he missed the last two visits. Patient reports that he did not exercise in this time period. Patient reports that overall he is feeling good, having less cramping sensations..  Pain reported as 0/10. left low back, buttock, lower extremity    Objective             Treatment:        CPT  Intervention  Performed    Today  Duration / Intensity    MT  Soft Tissue Mobilizations: left gastrocnemius          Cupping   8 minutes to hamstring and calf left     Functional Dry Needling   Verbal and written consent provided from patient      Performed to left lateral gastrocnemius with electrical stimulation- proximal and distal   TE  Re-Assessed Objective Measurements       NMR  Pelvic Tilts    3x10 reps 3 sec holds       Abdominal Bracing 90/90 holds    2x10 reps      90/90 Heel Taps    3x10 reps       Supine Glutes Squeezes- knees extended    3x10 reps       Recumbent Bike    8 minutes       Eliptical     8 minutes      Side Lying Clams    3x10 reps red band      Bridges- single leg bridge    3x10 reps       Bridges from Ball with Hamstring  Curls     2x10 reps      Prone Hamstring Curls    3x10 reps left 7 lbs      Prone Hip Extension with Knee Extended   3x10 reps B      Seated Internal Rotation- ball between knees, red band around ankles    3x10 reps       Side Lying Hip Adduction    3x10 reps left       Side Lying Hip Abduction    3x10 reps left       Seated Sciatic Nerve Glides   3x10 reps left     Hip Thrusts   3x10 reps 15 lbs double leg  2x10 reps single leg B   TA  Standing Hamstring Curls    2x10 reps left       Elliptical x 2 minutes forward, 2 minutes backwards level 8 2 times through     Standing Heel Raises- two up one down left   X 3x10 reps  from 1st step     Single leg calf raise standing   x 10 sec holds x10 reps at max height     Matrix single leg calf raise    3 x 10 bilateral 25lbs      Matrix Leg Press X  x 3x10 reps 165 lbs double leg  3x10 reps 95 lbs single leg B     Matrix Hamstring Curls x  x x10 reps 95 lbs Double leg  3x10 reps 25, 35, 45 lbs single leg     Matrix Knee Extensions X  x 3x10 reps 95 lbs double leg (95 the rest)  x10 reps 55 lbs, 2x10 reps 65 lbs single leg     Standing Hip Abduction and Extension   Green band 2x10 reps B each direction     Lateral heel tap with yellow bar for support   2 x 10 bilateral     TRX squats   3 x 10     Standing Hip Hikes from 6 inch step   3x10 reps, using stick for UE support     Single Leg Balance  x 4x30 sec holds left (foam pad or dynamic  next)     Side stepping and Monster Walks  x 4 laps with green band around ankles on turf      Soleus Raises with Matrix   3x10 reps 45 lbs (55 next time)     Standing Dorsiflexion- performed in unison    3x10 reps     PLAN  Matrix Knee Extension and Flexion  Matrix Soleus Raises                CPT Codes available for Billing:    (00) minutes of Manual therapy (MT) to improve pain and ROM.   (00) minutes of Therapeutic Exercise (TE) to develop strength, endurance, range of motion, and flexibility.   (00) minutes of Neuromuscular Re-Education  (NMR)? to improve: Balance, Coordination, Kinesthetic, Sense, Proprioception, and Posture.   (60) minutes of Therapeutic Activities (TA) to improve functional performance.   Vasopneumatic Device Therapy () for management of swelling/edema. (27118)   Unattended Electrical Stimulation (ES) for muscle performance or pain modulation.   BFR: Blood flow restriction applied during exercise ?      Assessment & Plan   Assessment: Patient with improving strength with all exercises performed as he is able to demonstrate ability to tolerated additional weight and with less overall fatigue and compensatory strategies following. Will continue to progress as tolerated.   Evaluation/Treatment Tolerance: Patient tolerated treatment well    Patient will continue to benefit from skilled outpatient physical therapy to address the deficits listed in the problem list box on initial evaluation, provide pt/family education and to maximize pt's level of independence in the home and community environment.     Patient's spiritual, cultural, and educational needs considered and patient agreeable to plan of care and goals.           Plan: Continue POC and frequency as planned. Adjust/modify treatment based off of previous response to treatment.    Goals:   Active       Long Term Goals       Pt will report worst pain of 2/10 in order to progress toward max functional ability and improve quality of life          (Progressing)       Start:  04/16/25    Expected End:  06/11/25            Patient will demonstrate improved function as indicated by a score of greater than or equal to 75 out of 100 on FOTO.              (Progressing)       Start:  04/16/25    Expected End:  06/11/25            Patient will improve strength to at least 4+/5 grossly,  in order to improve functional independence and quality of life.   (Progressing)       Start:  04/16/25    Expected End:  06/11/25               Short Term Goals       Pt will report worst pain of 5/10 in  order to progress toward max functional ability and improve quality of life          (Met)       Start:  04/16/25    Expected End:  05/14/25    Resolved:  06/10/25         Patient will improve strength by 1/2 a grade, in order to progress towards independence with functional activities.          (Met)       Start:  04/16/25    Expected End:  05/14/25    Resolved:  06/10/25         Patient will demonstrate improved function as indicated by a score of greater than or equal to 65 out of 100 on FOTO.          (Progressing)       Start:  04/16/25    Expected End:  05/14/25                      Ally Sutton, PT

## 2025-08-08 ENCOUNTER — CLINICAL SUPPORT (OUTPATIENT)
Dept: REHABILITATION | Facility: HOSPITAL | Age: 50
End: 2025-08-08
Payer: COMMERCIAL

## 2025-08-08 DIAGNOSIS — M54.16 LUMBAR RADICULAR PAIN: Primary | ICD-10-CM

## 2025-08-08 DIAGNOSIS — R68.89 DECREASED STRENGTH, ENDURANCE, AND MOBILITY: ICD-10-CM

## 2025-08-08 DIAGNOSIS — Z78.9 IMPAIRED MOBILITY AND ADLS: ICD-10-CM

## 2025-08-08 DIAGNOSIS — R53.1 DECREASED STRENGTH, ENDURANCE, AND MOBILITY: ICD-10-CM

## 2025-08-08 DIAGNOSIS — Z74.09 IMPAIRED MOBILITY AND ADLS: ICD-10-CM

## 2025-08-08 DIAGNOSIS — Z74.09 DECREASED STRENGTH, ENDURANCE, AND MOBILITY: ICD-10-CM

## 2025-08-08 PROCEDURE — 97112 NEUROMUSCULAR REEDUCATION: CPT

## 2025-08-08 PROCEDURE — 97530 THERAPEUTIC ACTIVITIES: CPT

## 2025-08-08 NOTE — PROGRESS NOTES
Outpatient Rehab  Physical Therapy Visit    Patient Name: Christo Banegas  MRN: [id number]  YOB: 1975  Encounter Date: 8/8/2025    Therapy Diagnosis:   Encounter Diagnoses   Name Primary?    Lumbar radicular pain Yes    Decreased strength, endurance, and mobility     Impaired mobility and ADLs        Physician: Jelly Chaney DO    Physician Orders: Eval and Treat  Medical Diagnosis: Left sided sciatica    Visit # / Visits Authorized:  20 / 32  Insurance Authorization Period: 4/14/2025 to 12/31/2025  Date of Evaluation: 4/14/2025  Plan of Care Certification: 6/9/2025 to 8/5/2025     PT/PTA:     Number of PTA visits since last PT visit:   Time In: 1236   Time Out: 1335  Total Time: 59   Total Billable Time: 59    FOTO:  Intake Score:  %  Survey Score 1:  %  Survey Score 2:  %         Subjective   Patient reports that he is having some tightness in his low back today, woke up with it but has been feeling better otherwise..  Pain reported as 2/10. left low back, buttock, lower extremity    Objective             Treatment:        CPT  Intervention  Performed    Today  Duration / Intensity    MT  Soft Tissue Mobilizations: left gastrocnemius          Cupping   8 minutes to hamstring and calf left     Functional Dry Needling   Verbal and written consent provided from patient      Performed to left lateral gastrocnemius with electrical stimulation- proximal and distal   TE  Re-Assessed Objective Measurements       NMR  Pelvic Tilts    3x10 reps 3 sec holds       Abdominal Bracing 90/90 holds    2x10 reps      90/90 Heel Taps    3x10 reps       Supine Glutes Squeezes- knees extended    3x10 reps       Recumbent Bike    8 minutes       Eliptical     8 minutes      Side Lying Clams    3x10 reps red band      Bridges- single leg bridge    3x10 reps       Bridges from Ball with Hamstring Curls     2x10 reps      Prone Hamstring Curls    3x10 reps left 7 lbs      Quadruped Hip Extension x 2x10 reps B      Seated  Internal Rotation- ball between knees, red band around ankles    3x10 reps       Side Lying Hip Adduction    3x10 reps left       Side Lying Hip Abduction    3x10 reps left       Seated Sciatic Nerve Glides   3x10 reps left     Hip Thrusts   3x10 reps 15 lbs double leg  2x10 reps single leg B     Open books X 2x10 reps B   TA            Elliptical x 2 minutes forward, 2 minutes backwards level 8 2 times through     Standing Heel Raises- two up one down left   X 3x10 reps  from 1st step     Single leg calf raise standing    Patient unable to perform on LLE so deferred      Matrix single leg calf raise    3 x 10 bilateral 25lbs      Standing Clamshells X 2x10 reps B     Matrix Leg Press X  x 3x10 reps 165 lbs double leg  3x10 reps 95 lbs single leg B     Matrix Hamstring Curls   x10 reps 95 lbs Double leg  3x10 reps 25, 35, 45 lbs single leg     Matrix Knee Extensions   3x10 reps 95 lbs double leg (95 the rest)  x10 reps 55 lbs, 2x10 reps 65 lbs single leg     Standing Hip Abduction and Extension   Green band 2x10 reps B each direction     Lateral heel tap with yellow bar for support   2 x 10 bilateral     TRX squats   3 x 10     Standing Hip Hikes from 6 inch step   3x10 reps, using stick for UE support     Single Leg Balance  x 4x30 sec holds left foam pad     Side stepping and Monster Walks  x 4 laps with green band around ankles on turf      Soleus Raises with Matrix   3x10 reps 45 lbs (55 next time)     Standing Dorsiflexion- performed in unison    3x10 reps     PLAN  Matrix Knee Extension and Flexion  Matrix Soleus Raises                CPT Codes available for Billing:    (00) minutes of Manual therapy (MT) to improve pain and ROM.   (00) minutes of Therapeutic Exercise (TE) to develop strength, endurance, range of motion, and flexibility.   (12) minutes of Neuromuscular Re-Education (NMR)? to improve: Balance, Coordination, Kinesthetic, Sense, Proprioception, and Posture.   (47) minutes of Therapeutic  Activities (TA) to improve functional performance.   Vasopneumatic Device Therapy () for management of swelling/edema. (70722)   Unattended Electrical Stimulation (ES) for muscle performance or pain modulation.   BFR: Blood flow restriction applied during exercise   ?      Assessment & Plan   Assessment: Patient reported that whenever standing single leg to put on his shoes he feels much more unstable on his left LE and soreness in his left glutes. Incorporated quadruped hip extension and standing clamshells to progress glutes strengthening along with incorporating unstable surface for single leg balance to improve stability.   Evaluation/Treatment Tolerance: Patient tolerated treatment well    Patient will continue to benefit from skilled outpatient physical therapy to address the deficits listed in the problem list box on initial evaluation, provide pt/family education and to maximize pt's level of independence in the home and community environment.     Patient's spiritual, cultural, and educational needs considered and patient agreeable to plan of care and goals.           Plan: Continue POC and frequency as planned. Adjust/modify treatment based off of previous response to treatment.    Goals:   Active       Long Term Goals       Pt will report worst pain of 2/10 in order to progress toward max functional ability and improve quality of life          (Progressing)       Start:  04/16/25    Expected End:  06/11/25            Patient will demonstrate improved function as indicated by a score of greater than or equal to 75 out of 100 on FOTO.              (Progressing)       Start:  04/16/25    Expected End:  06/11/25            Patient will improve strength to at least 4+/5 grossly,  in order to improve functional independence and quality of life.   (Progressing)       Start:  04/16/25    Expected End:  06/11/25               Short Term Goals       Pt will report worst pain of 5/10 in order to progress toward max  functional ability and improve quality of life          (Met)       Start:  04/16/25    Expected End:  05/14/25    Resolved:  06/10/25         Patient will improve strength by 1/2 a grade, in order to progress towards independence with functional activities.          (Met)       Start:  04/16/25    Expected End:  05/14/25    Resolved:  06/10/25         Patient will demonstrate improved function as indicated by a score of greater than or equal to 65 out of 100 on FOTO.          (Progressing)       Start:  04/16/25    Expected End:  05/14/25                      Ally Sutton, PT

## 2025-08-21 ENCOUNTER — OFFICE VISIT (OUTPATIENT)
Dept: FAMILY MEDICINE | Facility: CLINIC | Age: 50
End: 2025-08-21
Payer: COMMERCIAL

## 2025-08-21 VITALS
OXYGEN SATURATION: 97 % | BODY MASS INDEX: 28.27 KG/M2 | WEIGHT: 208.75 LBS | SYSTOLIC BLOOD PRESSURE: 122 MMHG | HEART RATE: 114 BPM | TEMPERATURE: 99 F | HEIGHT: 72 IN | DIASTOLIC BLOOD PRESSURE: 82 MMHG

## 2025-08-21 DIAGNOSIS — R05.8 POST-VIRAL COUGH SYNDROME: Primary | ICD-10-CM

## 2025-08-21 DIAGNOSIS — I10 PRIMARY HYPERTENSION: ICD-10-CM

## 2025-08-21 DIAGNOSIS — R09.82 POST-NASAL DRIP: ICD-10-CM

## 2025-08-21 DIAGNOSIS — E66.3 OVERWEIGHT (BMI 25.0-29.9): ICD-10-CM

## 2025-08-21 PROCEDURE — 99999 PR PBB SHADOW E&M-EST. PATIENT-LVL IV: CPT | Mod: PBBFAC,,,

## 2025-08-21 RX ORDER — PROMETHAZINE HYDROCHLORIDE AND DEXTROMETHORPHAN HYDROBROMIDE 6.25; 15 MG/5ML; MG/5ML
5 SYRUP ORAL
Qty: 220 ML | Refills: 0 | Status: SHIPPED | OUTPATIENT
Start: 2025-08-21

## 2025-08-21 RX ORDER — MONTELUKAST SODIUM 10 MG/1
10 TABLET ORAL NIGHTLY
Qty: 30 TABLET | Refills: 0 | Status: SHIPPED | OUTPATIENT
Start: 2025-08-21 | End: 2025-09-20

## 2025-08-21 RX ORDER — BENZONATATE 100 MG/1
100 CAPSULE ORAL 3 TIMES DAILY PRN
Qty: 30 CAPSULE | Refills: 0 | Status: SHIPPED | OUTPATIENT
Start: 2025-08-21 | End: 2025-08-31

## 2025-08-21 RX ORDER — METHYLPREDNISOLONE ACETATE 80 MG/ML
80 INJECTION, SUSPENSION INTRA-ARTICULAR; INTRALESIONAL; INTRAMUSCULAR; SOFT TISSUE ONCE
Status: COMPLETED | OUTPATIENT
Start: 2025-08-21 | End: 2025-08-21

## 2025-08-21 RX ADMIN — METHYLPREDNISOLONE ACETATE 80 MG: 80 INJECTION, SUSPENSION INTRA-ARTICULAR; INTRALESIONAL; INTRAMUSCULAR; SOFT TISSUE at 02:08

## 2025-08-22 ENCOUNTER — CLINICAL SUPPORT (OUTPATIENT)
Dept: REHABILITATION | Facility: HOSPITAL | Age: 50
End: 2025-08-22
Payer: COMMERCIAL

## 2025-08-22 DIAGNOSIS — Z74.09 IMPAIRED MOBILITY AND ADLS: ICD-10-CM

## 2025-08-22 DIAGNOSIS — R53.1 DECREASED STRENGTH, ENDURANCE, AND MOBILITY: ICD-10-CM

## 2025-08-22 DIAGNOSIS — Z74.09 DECREASED STRENGTH, ENDURANCE, AND MOBILITY: ICD-10-CM

## 2025-08-22 DIAGNOSIS — R68.89 DECREASED STRENGTH, ENDURANCE, AND MOBILITY: ICD-10-CM

## 2025-08-22 DIAGNOSIS — M54.16 LUMBAR RADICULAR PAIN: Primary | ICD-10-CM

## 2025-08-22 DIAGNOSIS — Z78.9 IMPAIRED MOBILITY AND ADLS: ICD-10-CM

## 2025-08-22 PROCEDURE — 97112 NEUROMUSCULAR REEDUCATION: CPT

## 2025-08-22 PROCEDURE — 97140 MANUAL THERAPY 1/> REGIONS: CPT

## 2025-08-22 PROCEDURE — 97530 THERAPEUTIC ACTIVITIES: CPT

## 2025-09-05 ENCOUNTER — CLINICAL SUPPORT (OUTPATIENT)
Dept: REHABILITATION | Facility: HOSPITAL | Age: 50
End: 2025-09-05
Payer: COMMERCIAL

## 2025-09-05 DIAGNOSIS — Z74.09 DECREASED STRENGTH, ENDURANCE, AND MOBILITY: ICD-10-CM

## 2025-09-05 DIAGNOSIS — R68.89 DECREASED STRENGTH, ENDURANCE, AND MOBILITY: ICD-10-CM

## 2025-09-05 DIAGNOSIS — M54.16 LUMBAR RADICULAR PAIN: Primary | ICD-10-CM

## 2025-09-05 DIAGNOSIS — Z78.9 IMPAIRED MOBILITY AND ADLS: ICD-10-CM

## 2025-09-05 DIAGNOSIS — R53.1 DECREASED STRENGTH, ENDURANCE, AND MOBILITY: ICD-10-CM

## 2025-09-05 DIAGNOSIS — Z74.09 IMPAIRED MOBILITY AND ADLS: ICD-10-CM

## 2025-09-05 PROCEDURE — 97530 THERAPEUTIC ACTIVITIES: CPT

## 2025-09-05 PROCEDURE — 97110 THERAPEUTIC EXERCISES: CPT

## 2025-09-05 PROCEDURE — 97140 MANUAL THERAPY 1/> REGIONS: CPT

## 2025-09-05 PROCEDURE — 97112 NEUROMUSCULAR REEDUCATION: CPT
